# Patient Record
Sex: FEMALE | Race: ASIAN | NOT HISPANIC OR LATINO | Employment: FULL TIME | URBAN - METROPOLITAN AREA
[De-identification: names, ages, dates, MRNs, and addresses within clinical notes are randomized per-mention and may not be internally consistent; named-entity substitution may affect disease eponyms.]

---

## 2019-10-31 ENCOUNTER — HOSPITAL ENCOUNTER (OUTPATIENT)
Facility: MEDICAL CENTER | Age: 39
End: 2019-10-31
Attending: PREVENTIVE MEDICINE
Payer: COMMERCIAL

## 2019-10-31 ENCOUNTER — EMPLOYEE HEALTH (OUTPATIENT)
Dept: OCCUPATIONAL MEDICINE | Facility: CLINIC | Age: 39
End: 2019-10-31

## 2019-10-31 ENCOUNTER — EH NON-PROVIDER (OUTPATIENT)
Dept: OCCUPATIONAL MEDICINE | Facility: CLINIC | Age: 39
End: 2019-10-31

## 2019-10-31 VITALS
TEMPERATURE: 97 F | OXYGEN SATURATION: 100 % | HEART RATE: 69 BPM | SYSTOLIC BLOOD PRESSURE: 120 MMHG | DIASTOLIC BLOOD PRESSURE: 84 MMHG | HEIGHT: 63 IN | BODY MASS INDEX: 23.57 KG/M2 | WEIGHT: 133 LBS

## 2019-10-31 DIAGNOSIS — Z02.1 PRE-EMPLOYMENT DRUG SCREENING: ICD-10-CM

## 2019-10-31 DIAGNOSIS — Z02.1 PRE-EMPLOYMENT HEALTH SCREENING EXAMINATION: ICD-10-CM

## 2019-10-31 LAB
AMP AMPHETAMINE: NORMAL
BAR BARBITURATES: NORMAL
BZO BENZODIAZEPINES: NORMAL
COC COCAINE: NORMAL
INT CON NEG: NORMAL
INT CON POS: NORMAL
MDMA ECSTASY: NORMAL
MET METHAMPHETAMINES: NORMAL
MTD METHADONE: NORMAL
OPI OPIATES: NORMAL
OXY OXYCODONE: NORMAL
PCP PHENCYCLIDINE: NORMAL
POC URINE DRUG SCREEN OCDRS: NORMAL
THC: NORMAL

## 2019-10-31 PROCEDURE — 90707 MMR VACCINE SC: CPT | Performed by: PREVENTIVE MEDICINE

## 2019-10-31 PROCEDURE — 80305 DRUG TEST PRSMV DIR OPT OBS: CPT | Performed by: PREVENTIVE MEDICINE

## 2019-10-31 PROCEDURE — 86787 VARICELLA-ZOSTER ANTIBODY: CPT | Performed by: PREVENTIVE MEDICINE

## 2019-10-31 PROCEDURE — 8915 PR COMPREHENSIVE PHYSICAL: Performed by: NURSE PRACTITIONER

## 2019-10-31 PROCEDURE — 86480 TB TEST CELL IMMUN MEASURE: CPT | Performed by: PREVENTIVE MEDICINE

## 2019-10-31 PROCEDURE — 94375 RESPIRATORY FLOW VOLUME LOOP: CPT | Performed by: PREVENTIVE MEDICINE

## 2019-10-31 PROCEDURE — 90686 IIV4 VACC NO PRSV 0.5 ML IM: CPT | Performed by: PREVENTIVE MEDICINE

## 2019-10-31 SDOH — HEALTH STABILITY: MENTAL HEALTH: HOW OFTEN DO YOU HAVE A DRINK CONTAINING ALCOHOL?: NEVER

## 2019-10-31 ASSESSMENT — VISUAL ACUITY
OS_CC: 20/15
OD_CC: 20/15

## 2019-11-01 LAB
GAMMA INTERFERON BACKGROUND BLD IA-ACNC: 0.04 IU/ML
M TB IFN-G BLD-IMP: NEGATIVE
M TB IFN-G CD4+ BCKGRND COR BLD-ACNC: 0.26 IU/ML
MITOGEN IGNF BCKGRD COR BLD-ACNC: >10 IU/ML
QFT TB2 - NIL TBQ2: 0.26 IU/ML
VZV IGG SER IA-ACNC: 1.32

## 2019-11-06 ENCOUNTER — EH NON-PROVIDER (OUTPATIENT)
Dept: OCCUPATIONAL MEDICINE | Facility: CLINIC | Age: 39
End: 2019-11-06

## 2019-11-06 DIAGNOSIS — Z01.89 RESPIRATORY CLEARANCE EXAMINATION, ENCOUNTER FOR: ICD-10-CM

## 2019-11-06 DIAGNOSIS — Z71.85 IMMUNIZATION COUNSELING: ICD-10-CM

## 2020-01-08 ENCOUNTER — HOSPITAL ENCOUNTER (OUTPATIENT)
Facility: MEDICAL CENTER | Age: 40
End: 2020-01-09
Attending: EMERGENCY MEDICINE
Payer: COMMERCIAL

## 2020-01-08 ENCOUNTER — NON-PROVIDER VISIT (OUTPATIENT)
Dept: OCCUPATIONAL MEDICINE | Facility: CLINIC | Age: 40
End: 2020-01-08
Payer: COMMERCIAL

## 2020-01-08 ENCOUNTER — APPOINTMENT (OUTPATIENT)
Dept: RADIOLOGY | Facility: MEDICAL CENTER | Age: 40
End: 2020-01-08
Attending: EMERGENCY MEDICINE
Payer: COMMERCIAL

## 2020-01-08 DIAGNOSIS — Z02.83 ENCOUNTER FOR DRUG SCREENING: ICD-10-CM

## 2020-01-08 DIAGNOSIS — Z02.1 PRE-EMPLOYMENT DRUG SCREENING: ICD-10-CM

## 2020-01-08 DIAGNOSIS — R55 SYNCOPE, VASOVAGAL: ICD-10-CM

## 2020-01-08 LAB
ALBUMIN SERPL BCP-MCNC: 3.6 G/DL (ref 3.2–4.9)
ALBUMIN/GLOB SERPL: 1.4 G/DL
ALP SERPL-CCNC: 41 U/L (ref 30–99)
ALT SERPL-CCNC: 12 U/L (ref 2–50)
ANION GAP SERPL CALC-SCNC: 9 MMOL/L (ref 0–11.9)
AST SERPL-CCNC: 15 U/L (ref 12–45)
BASOPHILS # BLD AUTO: 0.4 % (ref 0–1.8)
BASOPHILS # BLD: 0.03 K/UL (ref 0–0.12)
BILIRUB SERPL-MCNC: 0.4 MG/DL (ref 0.1–1.5)
BUN SERPL-MCNC: 18 MG/DL (ref 8–22)
CALCIUM SERPL-MCNC: 8.6 MG/DL (ref 8.5–10.5)
CHLORIDE SERPL-SCNC: 105 MMOL/L (ref 96–112)
CO2 SERPL-SCNC: 25 MMOL/L (ref 20–33)
CREAT SERPL-MCNC: 0.75 MG/DL (ref 0.5–1.4)
D DIMER PPP IA.FEU-MCNC: 0.45 UG/ML (FEU) (ref 0–0.5)
EOSINOPHIL # BLD AUTO: 0.08 K/UL (ref 0–0.51)
EOSINOPHIL NFR BLD: 1.1 % (ref 0–6.9)
ERYTHROCYTE [DISTWIDTH] IN BLOOD BY AUTOMATED COUNT: 42.8 FL (ref 35.9–50)
GLOBULIN SER CALC-MCNC: 2.6 G/DL (ref 1.9–3.5)
GLUCOSE BLD-MCNC: 113 MG/DL (ref 65–99)
GLUCOSE SERPL-MCNC: 159 MG/DL (ref 65–99)
HCG SERPL QL: NEGATIVE
HCT VFR BLD AUTO: 41.4 % (ref 37–47)
HGB BLD-MCNC: 13.2 G/DL (ref 12–16)
IMM GRANULOCYTES # BLD AUTO: 0.04 K/UL (ref 0–0.11)
IMM GRANULOCYTES NFR BLD AUTO: 0.6 % (ref 0–0.9)
LYMPHOCYTES # BLD AUTO: 2.36 K/UL (ref 1–4.8)
LYMPHOCYTES NFR BLD: 32.8 % (ref 22–41)
MCH RBC QN AUTO: 30.6 PG (ref 27–33)
MCHC RBC AUTO-ENTMCNC: 31.9 G/DL (ref 33.6–35)
MCV RBC AUTO: 96.1 FL (ref 81.4–97.8)
MONOCYTES # BLD AUTO: 0.2 K/UL (ref 0–0.85)
MONOCYTES NFR BLD AUTO: 2.8 % (ref 0–13.4)
NEUTROPHILS # BLD AUTO: 4.49 K/UL (ref 2–7.15)
NEUTROPHILS NFR BLD: 62.3 % (ref 44–72)
NRBC # BLD AUTO: 0 K/UL
NRBC BLD-RTO: 0 /100 WBC
PLATELET # BLD AUTO: 267 K/UL (ref 164–446)
PMV BLD AUTO: 9.5 FL (ref 9–12.9)
POTASSIUM SERPL-SCNC: 3.6 MMOL/L (ref 3.6–5.5)
PROT SERPL-MCNC: 6.2 G/DL (ref 6–8.2)
RBC # BLD AUTO: 4.31 M/UL (ref 4.2–5.4)
SODIUM SERPL-SCNC: 139 MMOL/L (ref 135–145)
TROPONIN T SERPL-MCNC: <6 NG/L (ref 6–19)
WBC # BLD AUTO: 7.2 K/UL (ref 4.8–10.8)

## 2020-01-08 PROCEDURE — 99026 IN-HOSPITAL ON CALL SERVICE: CPT | Performed by: PREVENTIVE MEDICINE

## 2020-01-08 PROCEDURE — 84484 ASSAY OF TROPONIN QUANT: CPT

## 2020-01-08 PROCEDURE — 80305 DRUG TEST PRSMV DIR OPT OBS: CPT | Performed by: PREVENTIVE MEDICINE

## 2020-01-08 PROCEDURE — 84703 CHORIONIC GONADOTROPIN ASSAY: CPT

## 2020-01-08 PROCEDURE — 93005 ELECTROCARDIOGRAM TRACING: CPT | Performed by: EMERGENCY MEDICINE

## 2020-01-08 PROCEDURE — 71046 X-RAY EXAM CHEST 2 VIEWS: CPT

## 2020-01-08 PROCEDURE — 93005 ELECTROCARDIOGRAM TRACING: CPT

## 2020-01-08 PROCEDURE — 85025 COMPLETE CBC W/AUTO DIFF WBC: CPT

## 2020-01-08 PROCEDURE — 80053 COMPREHEN METABOLIC PANEL: CPT

## 2020-01-08 PROCEDURE — 99285 EMERGENCY DEPT VISIT HI MDM: CPT

## 2020-01-08 PROCEDURE — 85379 FIBRIN DEGRADATION QUANT: CPT

## 2020-01-08 PROCEDURE — 82962 GLUCOSE BLOOD TEST: CPT

## 2020-01-08 PROCEDURE — 36415 COLL VENOUS BLD VENIPUNCTURE: CPT

## 2020-01-08 PROCEDURE — 82075 ASSAY OF BREATH ETHANOL: CPT | Performed by: PREVENTIVE MEDICINE

## 2020-01-08 RX ORDER — M-VIT,TX,IRON,MINS/CALC/FOLIC 27MG-0.4MG
1 TABLET ORAL DAILY
COMMUNITY

## 2020-01-08 NOTE — LETTER
"  FORM C-4:  EMPLOYEE’S CLAIM FOR COMPENSATION/ REPORT OF INITIAL TREATMENT  EMPLOYEE’S CLAIM - PROVIDE ALL INFORMATION REQUESTED   First Name Emeli Last Name Nhan Birthdate 1980  Sex female Claim Number   Home Employee Address [address]   [facility]                                     Zip  [address] Height  1.6 m (5' 3\") Weight  59 kg (130 lb) [facility]     Mailing Employee Address [address]    [facility]               Zip  [address] Telephone  [phone] (home)  Primary Language Spoken   Insurer   Third Party   WORKERS CHOICE 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 (if applicable) rounds    How did this injury or occupational disease occur? Be specific and answer in detail. Use additional sheet if necessary)  no. It was my first time to faint at work. I just really tired. I just got sick last week because almost everyone was getting sick on the floor, much like a flu.    If you believe that you have an occupational disease, when did you first have knowledge of the disability and it relationship to your employment? 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  I HEREBY AUTHORIZE ANY PHYSICIAN, CHIROPRACTOR, SURGEON, PRACTITIONER, OR OTHER PERSON, ANY HOSPITAL, INCLUDING [facility] OR [facility], ANY MEDICAL SERVICE ORGANIZATION, ANY INSURANCE COMPANY, OR OTHER INSTITUTION OR ORGANIZATION TO RELEASE TO EACH OTHER, ANY MEDICAL OR OTHER INFORMATION, INCLUDING BENEFITS PAID OR PAYABLE, PERTINENT TO THIS INJURY OR DISEASE, EXCEPT INFORMATION RELATIVE TO DIAGNOSIS, TREATMENT AND/OR COUNSELING FOR AIDS, PSYCHOLOGICAL CONDITIONS, ALCOHOL OR CONTROLLED SUBSTANCES, FOR WHICH I MUST GIVE SPECIFIC AUTHORIZATION.  A PHOTOSTAT OF THIS AUTHORIZATION SHALL BE AS VALID AS THE ORIGINAL.  Date   01/08/2020          Critical access hospital               Employee’s Signature   THIS REPORT MUST BE COMPLETED AND MAILED WITHIN 3 WORKING DAYS OF TREATMENT   Place [facility], EMERGENCY DEPT                                                                             Name of Facility [facility]   Date  1/8/2020 Diagnosis  (R55) Syncope, vasovagal Is there evidence the injured employee was under the influence of alcohol and/or another controlled substance at the time of accident?   Hour  11:24 PM Description of Injury or Disease  Syncope, vasovagal No   Treatment  IV fluids  Have you advised the patient to remain off work five days or more?         No   X-Ray Findings  Negative If Yes   From Date    To Date      From information given by the employee, together with medical evidence, can you directly connect this injury or occupational disease as job incurred? Yes If No, is employee capable of: Full Duty  Yes Modified Duty      Is additional medical care by a physician indicated? Yes If Modified Duty, Specify any Limitations / Restrictions       Do you know of any previous injury or disease contributing to this condition or occupational disease?  "No    Date 1/8/2020 Print Doctor’s Name Malathi Sade S I certify the employer’s copy of this form was mailed on:   Address [address] [place] [id number] [phone] INSURER’S USE ONLY   Provider’s Tax ID Number   Telephone Dept: [phone]    Doctor’s Signature e-SADE Frey M.D. Degree  MD      Form C-4 (rev.10/07)                                                                         BRIEF DESCRIPTION OF RIGHTS AND BENEFITS  (Pursuant to [place] 616C.050)    Notice of Injury or Occupational Disease (Incident Report Form C-1): If an injury or occupational disease (OD) arises out of and in the course of employment, you must provide written notice to your employer as soon as practicable, but no later than 7 days after the accident or OD. Your employer shall maintain a sufficient supply of the required forms.    Claim for Compensation (Form C-4): If medical treatment is sought, the form C-4 is available at the place of initial treatment. A completed \"Claim for Compensation\" (Form C-4) must be filed within 90 days after an accident or OD. The treating physician or chiropractor must, within 3 working days after treatment, complete and mail to the employer, the employer's insurer and third-party , the Claim for Compensation.    Medical Treatment: If you require medical treatment for your on-the-job injury or OD, you may be required to select a physician or chiropractor from a list provided by your workers’ compensation insurer, if it has contracted with an Organization for Managed Care (MCO) or Preferred Provider Organization (PPO) or providers of health care. If your employer has not entered into a contract with an MCO or PPO, you may select a physician or chiropractor from the Panel of Physicians and Chiropractors. Any medical costs related to your industrial injury or OD will be paid by your insurer.    Temporary Total Disability (TTD): If your doctor has certified that you are unable to " work for a period of at least 5 consecutive days, or 5 cumulative days in a 20-day period, or places restrictions on you that your employer does not accommodate, you may be entitled to TTD compensation.    Temporary Partial Disability (TPD): If the wage you receive upon reemployment is less than the compensation for TTD to which you are entitled, the insurer may be required to pay you TPD compensation to make up the difference. TPD can only be paid for a maximum of 24 months.    Permanent Partial Disability (PPD): When your medical condition is stable and there is an indication of a PPD as a result of your injury or OD, within 30 days, your insurer must arrange for an evaluation by a rating physician or chiropractor to determine the degree of your PPD. The amount of your PPD award depends on the date of injury, the results of the PPD evaluation and your age and wage.    Permanent Total Disability (PTD): If you are medically certified by a treating physician or chiropractor as permanently and totally disabled and have been granted a PTD status by your insurer, you are entitled to receive monthly benefits not to exceed 66 2/3% of your average monthly wage. The amount of your PTD payments is subject to reduction if you previously received a PPD award.    Vocational Rehabilitation Services: You may be eligible for vocational rehabilitation services if you are unable to return to the job due to a permanent physical impairment or permanent restrictions as a result of your injury or occupational disease.    Transportation and Per Wm Reimbursement: You may be eligible for travel expenses and per wm associated with medical treatment.    Reopening: You may be able to reopen your claim if your condition worsens after claim closure.     Appeal Process: If you disagree with a written determination issued by the insurer or the insurer does not respond to your request, you may appeal to the Department of Administration,  , by following the instructions contained in your determination letter. You must appeal the determination within 70 days from the date of the determination letter at [address], [address], [place], [place] [address], or [address], [address], [place], [place] [address]. If you disagree with the  decision, you may appeal to the Department of Administration, . You must file your appeal within 30 days from the date of the  decision letter at [address], [address], [place], [place] [address], or [address], [address], [place], [place] [address]. If you disagree with a decision of an , you may file a petition for judicial review with the District Court. You must do so within 30 days of the Appeal Officer’s decision. You may be represented by an  at your own expense or you may contact the [facility] for possible representation.    [place]  for Injured Workers (NAIW): If you disagree with a  decision, you may request that NAIW represent you without charge at an  Hearing. For information regarding denial of benefits, you may contact the [facility] at: [address], [address], [place] [address], [phone], or [address], [place] [address], [phone]    To File a Complaint with the Division: If you wish to file a complaint with the  of the Division of Industrial Relations (DIR),  please contact the Workers’ Compensation Section, [address], [address], [place], [place] [address], telephone [phone]) [phone], or [address], [address], [place], [place] [address], telephone [phone].    For assistance with Workers’ Compensation Issues: You may contact the Office of the Governor Consumer Health Assistance, [address], [address], [place], [place] [address], Toll Free 1-666.689.1292, Web site:  [email or web address] [email or web address]., E-mail [email or web address].  D-2 (rev. 06/18)              __________________________________________________________________                                    ___01/08/2020__            Employee Name / Signature                                                                                                                            Date

## 2020-01-09 ENCOUNTER — APPOINTMENT (OUTPATIENT)
Dept: CARDIOLOGY | Facility: MEDICAL CENTER | Age: 40
End: 2020-01-09
Attending: INTERNAL MEDICINE
Payer: COMMERCIAL

## 2020-01-09 VITALS
TEMPERATURE: 96.7 F | OXYGEN SATURATION: 97 % | BODY MASS INDEX: 23.04 KG/M2 | DIASTOLIC BLOOD PRESSURE: 69 MMHG | RESPIRATION RATE: 16 BRPM | HEIGHT: 63 IN | SYSTOLIC BLOOD PRESSURE: 113 MMHG | HEART RATE: 79 BPM | WEIGHT: 130 LBS

## 2020-01-09 PROBLEM — I31.9 PERICARDITIS: Status: ACTIVE | Noted: 2020-01-09

## 2020-01-09 PROBLEM — R55 SYNCOPE: Status: ACTIVE | Noted: 2020-01-09

## 2020-01-09 LAB
CRP SERPL HS-MCNC: 0.07 MG/DL (ref 0–0.75)
EKG IMPRESSION: NORMAL
ERYTHROCYTE [SEDIMENTATION RATE] IN BLOOD BY WESTERGREN METHOD: 10 MM/HOUR (ref 0–20)
LV EJECT FRACT  99904: 65
LV EJECT FRACT MOD 2C 99903: 71.9
LV EJECT FRACT MOD 4C 99902: 67.62
LV EJECT FRACT MOD BP 99901: 67.77
TROPONIN T SERPL-MCNC: <6 NG/L (ref 6–19)

## 2020-01-09 PROCEDURE — 700105 HCHG RX REV CODE 258: Performed by: INTERNAL MEDICINE

## 2020-01-09 PROCEDURE — 99236 HOSP IP/OBS SAME DATE HI 85: CPT | Performed by: INTERNAL MEDICINE

## 2020-01-09 PROCEDURE — A9270 NON-COVERED ITEM OR SERVICE: HCPCS | Performed by: INTERNAL MEDICINE

## 2020-01-09 PROCEDURE — G0378 HOSPITAL OBSERVATION PER HR: HCPCS

## 2020-01-09 PROCEDURE — 84484 ASSAY OF TROPONIN QUANT: CPT

## 2020-01-09 PROCEDURE — 86140 C-REACTIVE PROTEIN: CPT

## 2020-01-09 PROCEDURE — 93306 TTE W/DOPPLER COMPLETE: CPT | Mod: 26 | Performed by: INTERNAL MEDICINE

## 2020-01-09 PROCEDURE — 85652 RBC SED RATE AUTOMATED: CPT

## 2020-01-09 PROCEDURE — 700102 HCHG RX REV CODE 250 W/ 637 OVERRIDE(OP): Performed by: INTERNAL MEDICINE

## 2020-01-09 PROCEDURE — 93306 TTE W/DOPPLER COMPLETE: CPT

## 2020-01-09 RX ORDER — COLCHICINE 0.6 MG/1
0.6 TABLET ORAL DAILY
Status: DISCONTINUED | OUTPATIENT
Start: 2020-01-09 | End: 2020-01-09 | Stop reason: HOSPADM

## 2020-01-09 RX ORDER — ACETAMINOPHEN 325 MG/1
650 TABLET ORAL EVERY 6 HOURS PRN
Status: DISCONTINUED | OUTPATIENT
Start: 2020-01-09 | End: 2020-01-09 | Stop reason: HOSPADM

## 2020-01-09 RX ORDER — COLCHICINE 0.6 MG/1
0.6 TABLET ORAL 2 TIMES DAILY
Qty: 60 TAB | Refills: 2 | Status: SHIPPED | OUTPATIENT
Start: 2020-01-09 | End: 2020-06-10

## 2020-01-09 RX ORDER — POLYETHYLENE GLYCOL 3350 17 G/17G
1 POWDER, FOR SOLUTION ORAL
Status: DISCONTINUED | OUTPATIENT
Start: 2020-01-09 | End: 2020-01-09 | Stop reason: HOSPADM

## 2020-01-09 RX ORDER — BISACODYL 10 MG
10 SUPPOSITORY, RECTAL RECTAL
Status: DISCONTINUED | OUTPATIENT
Start: 2020-01-09 | End: 2020-01-09 | Stop reason: HOSPADM

## 2020-01-09 RX ORDER — ASPIRIN 325 MG
650 TABLET ORAL 3 TIMES DAILY
Status: DISCONTINUED | OUTPATIENT
Start: 2020-01-09 | End: 2020-01-09 | Stop reason: HOSPADM

## 2020-01-09 RX ORDER — ASPIRIN 325 MG
650 TABLET ORAL 3 TIMES DAILY
Qty: 50 TAB | Refills: 0 | Status: SHIPPED | OUTPATIENT
Start: 2020-01-09 | End: 2020-06-10

## 2020-01-09 RX ORDER — SENNOSIDES 8.6 MG
650 CAPSULE ORAL EVERY 6 HOURS PRN
COMMUNITY
End: 2020-10-02

## 2020-01-09 RX ORDER — LANOLIN ALCOHOL/MO/W.PET/CERES
6-9 CREAM (GRAM) TOPICAL
COMMUNITY
End: 2020-01-31

## 2020-01-09 RX ORDER — SODIUM CHLORIDE, SODIUM LACTATE, POTASSIUM CHLORIDE, CALCIUM CHLORIDE 600; 310; 30; 20 MG/100ML; MG/100ML; MG/100ML; MG/100ML
INJECTION, SOLUTION INTRAVENOUS CONTINUOUS
Status: DISCONTINUED | OUTPATIENT
Start: 2020-01-09 | End: 2020-01-09 | Stop reason: HOSPADM

## 2020-01-09 RX ORDER — AMOXICILLIN 250 MG
2 CAPSULE ORAL 2 TIMES DAILY
Status: DISCONTINUED | OUTPATIENT
Start: 2020-01-09 | End: 2020-01-09 | Stop reason: HOSPADM

## 2020-01-09 RX ADMIN — ASPIRIN 650 MG: 325 TABLET, FILM COATED ORAL at 02:00

## 2020-01-09 RX ADMIN — SODIUM CHLORIDE, POTASSIUM CHLORIDE, SODIUM LACTATE AND CALCIUM CHLORIDE: 600; 310; 30; 20 INJECTION, SOLUTION INTRAVENOUS at 06:23

## 2020-01-09 RX ADMIN — ASPIRIN 650 MG: 325 TABLET, FILM COATED ORAL at 06:18

## 2020-01-09 RX ADMIN — COLCHICINE 0.6 MG: 0.6 TABLET, FILM COATED ORAL at 06:18

## 2020-01-09 ASSESSMENT — ENCOUNTER SYMPTOMS
SORE THROAT: 0
DIZZINESS: 1
SPUTUM PRODUCTION: 0
FOCAL WEAKNESS: 0
FLANK PAIN: 0
VOMITING: 0
DIARRHEA: 0
SHORTNESS OF BREATH: 0
PALPITATIONS: 0
BRUISES/BLEEDS EASILY: 0
HEADACHES: 0
FEVER: 0
COUGH: 0
CHILLS: 0
SEIZURES: 0
NECK PAIN: 0
NAUSEA: 0
ABDOMINAL PAIN: 0
WHEEZING: 0
LOSS OF CONSCIOUSNESS: 1
BLURRED VISION: 0
BLOOD IN STOOL: 0
MYALGIAS: 0
BACK PAIN: 0
DIAPHORESIS: 0

## 2020-01-09 ASSESSMENT — COPD QUESTIONNAIRES
DO YOU EVER COUGH UP ANY MUCUS OR PHLEGM?: NO/ONLY WITH OCCASIONAL COLDS OR INFECTIONS
DURING THE PAST 4 WEEKS HOW MUCH DID YOU FEEL SHORT OF BREATH: NONE/LITTLE OF THE TIME
HAVE YOU SMOKED AT LEAST 100 CIGARETTES IN YOUR ENTIRE LIFE: NO/DON'T KNOW
COPD SCREENING SCORE: 0

## 2020-01-09 ASSESSMENT — LIFESTYLE VARIABLES: EVER_SMOKED: NEVER

## 2020-01-09 NOTE — ASSESSMENT & PLAN NOTE
I have started the patient on aspirin 650 mg 3 times daily and colchicine 0.6 mg daily  Check 2D echo  Check ESR and CRP  Continuous cardiac monitoring with serial EKG and troponin

## 2020-01-09 NOTE — ED NOTES
Pt discharged home. Explained discharge instructions. Questions and concerns addressed. Pt verbalized understanding of instructions. Wristband removed. Pt advised to follow-up with pcp or return to ED for any new or worsening of symptoms. Pt is ambulating well and steady on feet. VS stable. Pt family member will be escorting pt home.     PIV removed and dressing applied  Pt verbalized understanding of medication instructions.

## 2020-01-09 NOTE — ED NOTES
Pt ambulatory to restroom with steady gait and no dizziness. Pt updated on POC, hospitalist contacted.

## 2020-01-09 NOTE — DISCHARGE SUMMARY
Discharge Summary    CHIEF COMPLAINT ON ADMISSION  Chief Complaint   Patient presents with   • Chest Pain     left side   • Dizziness     pt has hx or prolapse mitral valve       Reason for Admission  Syncope     Admission Date  1/8/2020    CODE STATUS  Full Code    HPI & HOSPITAL COURSE  This is a 39 y.o. female here with  a syncopal episode that occurred earlier today.  Patient states she had a viral upper respiratory tract infection a week ago.  Today at work she developed symptoms of lightheadedness after which she had a syncopal episode.  She noted some left-sided chest pain without radiation.  Her pain would improve with leaning forward and worse with lying down.  She denies any fevers, chills, headache, nausea, vomiting or abdominal pain.  She denies any new medications.  She does not smoke, drink alcohol or use illicit drugs.     EKG showed sinus rhythm with minimal diffuse ST elevations consistent with pericarditis  Chest x-ray interpreted by me reveals no acute cardiopulmonary process   Echo showed normal EF, essentially normal.  CRP/ESR/trops were WNL.    Cause of her pericarditis is most likely viral.  Patient had no dysrhythmia on tele here and her syncope likely was vasovagal due to viral illness/pericarditis.  Patient was started on ASA/colchicine with improvement of sx's and would like to go home.  Patient is otherwise healthy, had no high risk indications for her pericarditis and can discharge home with outpatient f/u.  She was advised to return to hospital for worsening sx's.  No strenuous activities recommended until complete resolution of her pericarditis.       Therefore, she is discharged in good and stable condition to home with close outpatient follow-up.    The patient recovered much more quickly than anticipated on admission.    Discharge Date  1/9/20    FOLLOW UP ITEMS POST DISCHARGE  pcp next week    DISCHARGE DIAGNOSES  Active Problems:    Pericarditis POA: Yes    Syncope POA:  Yes  Resolved Problems:    * No resolved hospital problems. *      FOLLOW UP  No future appointments.  [address] [address]  [phone]  Schedule an appointment as soon as possible for a visit in 1 day  to establish with a PCP, For recheck    [address] [phone]  Schedule an appointment as soon as possible for a visit in 1 day  to establish with a PCP, For recheck        In 1 week        MEDICATIONS ON DISCHARGE     Medication List      START taking these medications      Instructions   aspirin 325 MG Tabs  Commonly known as:  ASA   Take 2 Tabs by mouth 3 times a day.  Dose:  650 mg     colchicine 0.6 MG Tabs  Commonly known as:  COLCRYS   Take 1 Tab by mouth 2 times a day.  Dose:  0.6 mg        CONTINUE taking these medications      Instructions   CALCIUM PO   Take 1 Tab by mouth every morning.  Dose:  1 Tab     CHONDROITIN SULFATE PO   Take 2 Tabs by mouth every evening.  Dose:  2 Tab     COQ10 PO   Take 2 Tabs by mouth every morning.  Dose:  2 Tab     melatonin 3 MG Tabs   Take 6-9 mg by mouth every bedtime.  Dose:  6-9 mg     NYQUIL HBP COLD & FLU 15-6. MG/15ML Liqd  Generic drug:  DM-Doxylamine-Acetaminophen   Take 30 mL by mouth every four hours as needed (cold).  Dose:  30 mL     therapeutic multivitamin-minerals Tabs   Take 1 Tab by mouth every day.  Dose:  1 Tab     TYLENOL 8 HOUR ARTHRITIS PAIN 650 MG CR tablet  Generic drug:  acetaminophen   Take 650 mg by mouth every 6 hours as needed for Moderate Pain.  Dose:  650 mg     VITAMIN B12 PO   Take 1 Tab by mouth every morning.  Dose:  1 Tab     VITAMIN C PO   Take 1 Tab by mouth every morning.  Dose:  1 Tab     VITAMIN D PO   Take 1 Tab by mouth every morning.  Dose:  1 Tab            Allergies  No Known Allergies    DIET  Orders Placed This Encounter   Procedures   • Diet Order Cardiac     Standing Status:   Standing     Number of  Occurrences:   1     Order Specific Question:   Diet:     Answer:   Cardiac [6]     Order Specific Question:   Miscellaneous modifications:     Answer:   No Decaf, No Caffeine(for test) [11]       ACTIVITY  Light duty.  Weight bearing as tolerated    CONSULTATIONS  none    PROCEDURES  none    LABORATORY  Lab Results   Component Value Date    SODIUM 139 01/08/2020    POTASSIUM 3.6 01/08/2020    CHLORIDE 105 01/08/2020    CO2 25 01/08/2020    GLUCOSE 159 (H) 01/08/2020    BUN 18 01/08/2020    CREATININE 0.75 01/08/2020        Lab Results   Component Value Date    WBC 7.2 01/08/2020    HEMOGLOBIN 13.2 01/08/2020    HEMATOCRIT 41.4 01/08/2020    PLATELETCT 267 01/08/2020        Total time of the discharge process exceeds 30 minutes.

## 2020-01-09 NOTE — ED PROVIDER NOTES
ED Provider Note    CHIEF COMPLAINT  Chief Complaint   Patient presents with   • Chest Pain     left side   • Dizziness     pt has hx or prolapse mitral valve       HPI  Emeli Justin is a 39 y.o. female with a history of mitral valve prolapse who presents with a chief complaint of syncope.  Patient reports that she had a flulike illness approximately 1 week ago that consisted of cough and sore throat.  It resolved on its own but this morning after getting off of her work/night shift she felt fatigued and took NyQuil and melatonin in order to facilitate a good sleep due to concern that she might be getting ill again.  She woke up at her baseline state of health and presented to work but shortly thereafter she began to feel lightheaded, as though the blood was draining from her brain.  She then lost her vision and felt cold as well as nauseated.  She subsequently had a syncopal episode.  When she awoke, she noted a sensation of left-sided chest tightness that persisted for several minutes before it resolved on its own.  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  She has never had similar symptoms.  She denies any history of DVT or PE, lower extremity edema, exogenous hormone use, recent surgeries or long distance travel.  She further denies any tongue biting or incontinence.    REVIEW OF SYSTEMS  See HPI for further details.  Chest pain.  Fatigue.  Syncope.  All other systems are negative.     PAST MEDICAL HISTORY   has a past medical history of Mitral valve prolapse (2015) and Pre-eclampsia (2014).    SOCIAL HISTORY  Social History     Tobacco Use   • Smoking status: Never Smoker   • Smokeless tobacco: Never Used   Substance and Sexual Activity   • Alcohol use: Never     Frequency: Never   • Drug use: Never   • Sexual activity: Not on file       SURGICAL HISTORY  patient denies any surgical history    CURRENT MEDICATIONS  Home Medications     Reviewed by Fatoumata Raza R.N.  "(Registered Nurse) on 20 at 1953  Med List Status: Complete   Medication Last Dose Status   therapeutic multivitamin-minerals (THERAGRAN-M) Tab  Active                ALLERGIES  No Known Allergies    PHYSICAL EXAM  VITAL SIGNS: BP (!) 108/34   Pulse 68   Temp 35.9 °C (96.7 °F) (Temporal)   Resp 14   Ht 1.6 m (5' 3\")   Wt 59 kg (130 lb)   LMP 2019 (Exact Date)   SpO2 100%   BMI 23.03 kg/m²     Pulse ox interpretation: I interpret this pulse ox as normal.  Constitutional: Alert in no apparent distress.  HENT: No signs of trauma, Bilateral external ears normal, Nose normal.  Moist mucous membranes.  Eyes: Pupils are equal and reactive, Conjunctiva normal, Non-icteric.   Neck: Normal range of motion, No tenderness, Supple, No stridor.   Lymphatic: No lymphadenopathy noted.   Cardiovascular: Regular rate and rhythm, no murmurs.   Thorax & Lungs: Normal breath sounds, No respiratory distress, No wheezing, No chest tenderness.   Abdomen: Bowel sounds normal, Soft, No tenderness, No masses, No pulsatile masses. No peritoneal signs.  Skin: Warm, Dry, No erythema, No rash.   Back: Normal alignment.  Extremities: Intact distal pulses, No edema, No tenderness, No cyanosis.  Musculoskeletal: Good range of motion in all major joints. No tenderness to palpation or major deformities noted.   Neurologic: Alert, Normal motor function, Normal sensory function, No focal deficits noted.   Psychiatric: Affect normal, Judgment normal, Mood normal.     DIAGNOSTIC STUDIES / PROCEDURES    EKG  Results for orders placed or performed during the hospital encounter of 20   EKG (NOW)   Result Value Ref Range    Report       [facility] Emergency Dept.    Test Date:  2020  Pt Name:    YAN NGUYEN             Department: ER  MRN:        [id number]                      Room:  Gender:     Female                       Technician: [id number]  :        1980                   Requested By:ER TRIAGE " PROTOCOL  Order #:    [id number]                    Reading MD: Edenilson Servin MD    Measurements  Intervals                                Axis  Rate:       70                           P:          39  MO:         152                          QRS:        79  QRSD:       78                           T:          45  QT:         408  QTc:        441    Interpretive Statements  SINUS RHYTHM  ST ELEVATION SUGGESTS PERICARDITIS  BASELINE WANDER IN LEAD(S) V2  No previous ECG available for comparison  Electronically Signed On 1-9-2020 1:28:58 PST by Edenilson Servin MD       LABS  CBC  CMP  Troponin  Accu-Chek  D-dimer  hCG    RADIOLOGY  Chest x-ray    COURSE & MEDICAL DECISION MAKING  Pertinent Labs & Imaging studies reviewed. (See chart for details)  This is a 39-year-old female with a history of mitral valve prolapse who is here with an episode of syncope following lightheadedness, nausea, and chest tightness.  Differential diagnosis includes, but is not limited to, arrhythmia, PE, vasovagal syncope, dehydration, electrolyte abnormality, hypoglycemia, seizure.  Arrives afebrile with normal vital signs.  Appears well-hydrated and nontoxic.  Physical exam is unremarkable.  She is alert and oriented x4.  She notes that she still feels lightheaded with some persistent left-sided chest tightness.    An EKG does reveal some ST elevation in the lateral leads consistent with potential pericarditis.  There are no reciprocal inversions and the EKG does not meet STEMI criteria.    Accu-Chek is 113.    CBC is grossly normal.  Metabolic panel is also normal with the exception of a blood glucose at 159.  Troponin is negative.  An hCG is negative.  D-dimer is negative.    Chest x-ray is normal.    The patient was ruled out for PE by PERC criteria:    AGE < 50  No estrogens, BCPs, recent surgery (4 weeks)  No hx of: DVT/PE or hemoptysis  No unilateral leg swelling  Pulse Ox > 94% and HR <100    Orthostatics are negative but patient reports  that she becomes dizzy with standing and ambulation.  Upon reevaluation, patient reports continued lightheadedness and chest tightness.  No new symptoms.  She will benefit from overnight hospitalization, observation, and potential cardiology consult tomorrow morning.  Patient is comfortable with the plan.    I discussed the patient with the on-call hospitalist, Dr. Guerrero, who will evaluate the patient for hospitalization.    DISPO  The patient was hospitalized by Dr. Guerrero in guarded condition.    HYDRATION  HYDRATION: Based on the patient's presentation of Other Syncope the patient was given IV fluids. IV Hydration was used because oral hydration was not adequate alone. Upon recheck following hydration, the patient was Unchanged.    FINAL IMPRESSION  1.  Syncope  2.  Chest pressure    Electronically signed by: Edenilson Servin, 1/8/2020 8:08 PM

## 2020-01-09 NOTE — DISCHARGE INSTRUCTIONS
You were seen in the ER for loss of consciousness.  Your labs and imaging did not reveal an acute abnormality that requires further work-up, consultation, or admission to the hospital.  You are safe for discharge.  I suspect that you passed out due to something called a vasovagal episode which happens when you get dehydrated, your calories are low, or you see/feel something that is unpleasant to you.  It is important that you follow-up with your primary care physician and I have given you a list of local clinics where you can do this.  Please call 1 tomorrow morning to make an appointment.  Return immediately to the ER with any new or worsening symptoms.

## 2020-01-09 NOTE — ED NOTES
Medication reconciliation updated and complete per pt at bedside  Allergies have been verified   No oral ABX within the last 14 days  Pt home pharmacy:[facility]

## 2020-01-09 NOTE — ED NOTES
Assist rn    Pt informs that she is feeling lightheaded after returning from the bathroom and would like to lay down. The pt appears pale at this time. BP retaken and primary RN notified

## 2020-01-09 NOTE — ED TRIAGE NOTES
"Chief Complaint   Patient presents with   • Chest Pain     left side   • Dizziness     pt has hx or prolapse mitral valve       Pt code assist from [facility].  Pt RN beginning shift and getting report.  Pt got dizzy and light headed and having left side chest pain.  Pt denies previous episode.     BP (!) 108/34   Pulse 68   Temp 35.9 °C (96.7 °F) (Temporal)   Resp 14   Ht 1.6 m (5' 3\")   Wt 59 kg (130 lb)   LMP 12/26/2019 (Exact Date)   SpO2 100%   BMI 23.03 kg/m²    "

## 2020-01-09 NOTE — ED NOTES
asssit rn    Pt wheeled to bathroom via wheelchair by workman's comp testing employee. The pt denies any lightheadedness, h/a, or blurred/tunneling vision at this time.

## 2020-01-09 NOTE — ED NOTES
This patient's attending physician will be Dr. Mello starting at 0700. Please page him with questions/updates.

## 2020-01-09 NOTE — ED NOTES
"Pt resting. Pt alert, no distress noted. Pt requesting \"I feel better than yesterday, do you think I will be able to go home today?\" Hospitalist paged and made aware of pt request.   "

## 2020-01-09 NOTE — ED NOTES
Education provided on POC, pt verbalized understanding. Admitting DX discussed and EKG results reviewed.

## 2020-01-15 LAB
AMP AMPHETAMINE: NORMAL
BAR BARBITURATES: NORMAL
BREATH ALCOHOL COMMENT: NORMAL
BZO BENZODIAZEPINES: NORMAL
COC COCAINE: NORMAL
INT CON NEG: NORMAL
INT CON POS: NORMAL
MDMA ECSTASY: NORMAL
MET METHAMPHETAMINES: NORMAL
MTD METHADONE: NORMAL
OPI OPIATES: NORMAL
OXY OXYCODONE: NORMAL
PCP PHENCYCLIDINE: NORMAL
POC BREATHALIZER: 0 PERCENT (ref 0–0.01)
POC URINE DRUG SCREEN OCDRS: NEGATIVE
THC: NORMAL

## 2020-01-22 ENCOUNTER — OFFICE VISIT (OUTPATIENT)
Dept: URGENT CARE | Facility: CLINIC | Age: 40
End: 2020-01-22
Payer: COMMERCIAL

## 2020-01-22 VITALS
DIASTOLIC BLOOD PRESSURE: 78 MMHG | WEIGHT: 135.4 LBS | HEIGHT: 63 IN | BODY MASS INDEX: 23.99 KG/M2 | OXYGEN SATURATION: 97 % | TEMPERATURE: 97.2 F | HEART RATE: 72 BPM | RESPIRATION RATE: 16 BRPM | SYSTOLIC BLOOD PRESSURE: 112 MMHG

## 2020-01-22 DIAGNOSIS — I31.9 PERICARDITIS, UNSPECIFIED CHRONICITY, UNSPECIFIED TYPE: ICD-10-CM

## 2020-01-22 DIAGNOSIS — R07.9 CHEST PAIN, UNSPECIFIED TYPE: ICD-10-CM

## 2020-01-22 PROCEDURE — 99204 OFFICE O/P NEW MOD 45 MIN: CPT | Performed by: INTERNAL MEDICINE

## 2020-01-22 ASSESSMENT — ENCOUNTER SYMPTOMS
ABDOMINAL PAIN: 0
DIZZINESS: 0
PALPITATIONS: 0
DIAPHORESIS: 1

## 2020-01-22 NOTE — PROGRESS NOTES
Subjective:     Emeli Justin is a 39 y.o. female who presents for Chest Pain (intense pain last night, tightness, a little sharp, was diagnosis with perioidictis  x admitted in the ER  Janurary 8th )  The patient had URI symptoms a week before she passed out on January 8 and she had a syncopal work-up and she was found to have viral pericarditis and she was doing better and since Sunday again she started having chest pressure and when it first started she had diaphoresis, chest pressure now now it is waxing and waning and on Sunday she had sore throat and body aches and since then those symptoms have resolved     Chest Pain    This is a new problem. The current episode started in the past 7 days. The onset quality is gradual. The problem occurs intermittently. The problem has been waxing and waning. The pain is present in the lateral region and substernal region. The pain is mild. The quality of the pain is described as pressure and sharp. Associated symptoms include diaphoresis (sunday when it first started). Pertinent negatives include no abdominal pain, dizziness or palpitations.     Past Medical History:   Diagnosis Date   • Mitral valve prolapse 2015   • Pre-eclampsia 2014   No past surgical history on file.  Social History     Socioeconomic History   • Marital status: Single     Spouse name: Not on file   • Number of children: Not on file   • Years of education: Not on file   • Highest education level: Not on file   Occupational History   • Not on file   Social Needs   • Financial resource strain: Not on file   • Food insecurity:     Worry: Not on file     Inability: Not on file   • Transportation needs:     Medical: Not on file     Non-medical: Not on file   Tobacco Use   • Smoking status: Never Smoker   • Smokeless tobacco: Never Used   Substance and Sexual Activity   • Alcohol use: Never     Frequency: Never   • Drug use: Never   • Sexual activity: Not on file   Lifestyle   • Physical activity:      "Days per week: Not on file     Minutes per session: Not on file   • Stress: Not on file   Relationships   • Social connections:     Talks on phone: Not on file     Gets together: Not on file     Attends [employer] service: Not on file     Active member of club or organization: Not on file     Attends meetings of clubs or organizations: Not on file     Relationship status: Not on file   • Intimate partner violence:     Fear of current or ex partner: Not on file     Emotionally abused: Not on file     Physically abused: Not on file     Forced sexual activity: Not on file   Other Topics Concern   • Not on file   Social History Narrative   • Not on file    No family history on file. Review of Systems   Constitutional: Positive for diaphoresis (sunday when it first started).   Cardiovascular: Positive for chest pain. Negative for palpitations.   Gastrointestinal: Negative for abdominal pain.   Neurological: Negative for dizziness.   All other systems reviewed and are negative.  No Known Allergies   Objective:   /78   Pulse 72   Temp 36.2 °C (97.2 °F) (Temporal)   Resp 16   Ht 1.6 m (5' 3\")   Wt 61.4 kg (135 lb 6.4 oz)   LMP 12/26/2019 (Exact Date)   SpO2 97%   BMI 23.99 kg/m²   Physical Exam  Constitutional:       General: She is not in acute distress.     Appearance: She is well-developed.   HENT:      Head: Normocephalic and atraumatic.      Mouth/Throat:      Mouth: Mucous membranes are moist.      Pharynx: Oropharynx is clear.   Eyes:      Conjunctiva/sclera: Conjunctivae normal.   Neck:      Musculoskeletal: No neck rigidity.   Cardiovascular:      Rate and Rhythm: Normal rate and regular rhythm.   Pulmonary:      Effort: Pulmonary effort is normal. No respiratory distress.      Breath sounds: Normal breath sounds.   Chest:      Chest wall: Tenderness present.   Lymphadenopathy:      Cervical: No cervical adenopathy.   Skin:     General: Skin is warm and dry.      Capillary Refill: Capillary refill " takes less than 2 seconds.   Neurological:      Mental Status: She is alert and oriented to person, place, and time.      Sensory: No sensory deficit.      Deep Tendon Reflexes: Reflexes are normal and symmetric.   Psychiatric:         Mood and Affect: Mood normal.         Behavior: Behavior normal.           Assessment/Plan:   Assessment    1. Chest pain, unspecified type  - EKG - Clinic Performed  - REFERRAL TO CARDIOLOGY    2. Pericarditis, unspecified chronicity, unspecified type  - EKG - Clinic Performed  - REFERRAL TO CARDIOLOGY    Patient is still having pleuritic chest pain but EKG shows its improving after pericarditis and she is taking aspirin and colchicine and will do a referral to cardiology for follow-up    EKG: NSR rate:  76 , normal axis, normal intervals, no evidence of ischemia or hypertrophy.  Differential diagnosis, natural history, supportive care, and indications for immediate follow-up discussed.

## 2020-01-27 ENCOUNTER — OFFICE VISIT (OUTPATIENT)
Dept: URGENT CARE | Facility: CLINIC | Age: 40
End: 2020-01-27
Payer: COMMERCIAL

## 2020-01-27 VITALS
HEART RATE: 85 BPM | OXYGEN SATURATION: 98 % | HEIGHT: 63 IN | BODY MASS INDEX: 23.92 KG/M2 | WEIGHT: 135 LBS | TEMPERATURE: 98 F | SYSTOLIC BLOOD PRESSURE: 108 MMHG | DIASTOLIC BLOOD PRESSURE: 68 MMHG

## 2020-01-27 DIAGNOSIS — I31.9 PERICARDITIS, UNSPECIFIED CHRONICITY, UNSPECIFIED TYPE: Primary | ICD-10-CM

## 2020-01-27 DIAGNOSIS — R05.2 SUBACUTE COUGH: ICD-10-CM

## 2020-01-27 PROCEDURE — 99214 OFFICE O/P EST MOD 30 MIN: CPT | Performed by: PHYSICIAN ASSISTANT

## 2020-01-27 ASSESSMENT — ENCOUNTER SYMPTOMS
WHEEZING: 0
SINUS PAIN: 0
MYALGIAS: 1
SPUTUM PRODUCTION: 0
BACK PAIN: 0
COUGH: 1
SHORTNESS OF BREATH: 0
ABDOMINAL PAIN: 0
FATIGUE: 1
VOMITING: 0
ANOREXIA: 0
FEVER: 0
NAUSEA: 0
HEADACHES: 0

## 2020-01-27 NOTE — PROGRESS NOTES
Subjective:      Emeli Justin is a 39 y.o. female who presents with Follow-Up (Pt is due to go back to work but she doesnt feel ready to go back.  She still feels fatigue and tightness in her chest)    PMH:  has a past medical history of Mitral valve prolapse (2015) and Pre-eclampsia (2014).  MEDS:   Current Outpatient Medications:   •  DM-Doxylamine-Acetaminophen (NYQUIL HBP COLD & FLU) 15-6. MG/15ML Liquid, Take 30 mL by mouth every four hours as needed (cold)., Disp: , Rfl:   •  acetaminophen (TYLENOL 8 HOUR ARTHRITIS PAIN) 650 MG CR tablet, Take 650 mg by mouth every 6 hours as needed for Moderate Pain., Disp: , Rfl:   •  Ascorbic Acid (VITAMIN C PO), Take 1 Tab by mouth every morning., Disp: , Rfl:   •  Cholecalciferol (VITAMIN D PO), Take 1 Tab by mouth every morning., Disp: , Rfl:   •  CALCIUM PO, Take 1 Tab by mouth every morning., Disp: , Rfl:   •  Cyanocobalamin (VITAMIN B12 PO), Take 1 Tab by mouth every morning., Disp: , Rfl:   •  Coenzyme Q10 (COQ10 PO), Take 2 Tabs by mouth every morning., Disp: , Rfl:   •  melatonin 3 MG Tab, Take 6-9 mg by mouth every bedtime., Disp: , Rfl:   •  colchicine (COLCRYS) 0.6 MG Tab, Take 1 Tab by mouth 2 times a day., Disp: 60 Tab, Rfl: 2  •  aspirin (ASA) 325 MG Tab, Take 2 Tabs by mouth 3 times a day., Disp: 50 Tab, Rfl: 0  •  therapeutic multivitamin-minerals (THERAGRAN-M) Tab, Take 1 Tab by mouth every day., Disp: , Rfl:   •  CHONDROITIN SULFATE PO, Take 2 Tabs by mouth every evening., Disp: , Rfl:   ALLERGIES: No Known Allergies  SURGHX: History reviewed. No pertinent surgical history.  SOCHX:  reports that she has never smoked. She has never used smokeless tobacco. She reports that she does not drink alcohol or use drugs.  FH: Reviewed with patient, not pertinent to this visit.           Patient presents with:  Follow-Up: Pt is due to go back to work but she doesnt feel ready to go back.  She still feels fatigue and tightness in her chest with exertion  "though patient denies active pain right now.  Pt is asking for a work note to stay home a few more days.      Patient was seen in urgent care a few days ago and was placed on colchicine and aspirin for her pericarditis symptoms.  Patient states they have actually improved quite a bit since starting the colchicine but patient states she just does not feel like she is ready to return to work yet as she fatigues easily on exertion and feels pressure in her chest on exertion.    Pt has not yet been seen by cardiology, however she has an appointment on 2/7/2020 (10 days from today) and was hoping that she could not work until seen by them.      Other   This is a new problem. The current episode started 1 to 4 weeks ago. The problem occurs daily. The problem has been waxing and waning. Associated symptoms include chest pain, coughing (Occasional dry sometimes productive), fatigue and myalgias. Pertinent negatives include no abdominal pain, anorexia, congestion, fever, headaches, nausea or vomiting. The symptoms are aggravated by exertion. She has tried NSAIDs, position changes and rest for the symptoms. The treatment provided moderate relief.       Review of Systems   Constitutional: Positive for fatigue and malaise/fatigue. Negative for fever.   HENT: Negative for congestion and sinus pain.    Respiratory: Positive for cough (Occasional dry sometimes productive). Negative for sputum production, shortness of breath and wheezing.    Cardiovascular: Positive for chest pain.   Gastrointestinal: Negative for abdominal pain, anorexia, nausea and vomiting.   Musculoskeletal: Positive for myalgias. Negative for back pain.   Neurological: Negative for headaches.   All other systems reviewed and are negative.         Objective:     /68   Pulse 85   Temp 36.7 °C (98 °F)   Ht 1.6 m (5' 3\")   Wt 61.2 kg (135 lb)   SpO2 98%   BMI 23.91 kg/m²      Physical Exam  Vitals signs and nursing note reviewed.   Constitutional:      "  General: She is not in acute distress.     Appearance: Normal appearance. She is well-developed.   HENT:      Head: Normocephalic and atraumatic.      Right Ear: Tympanic membrane normal.      Left Ear: Tympanic membrane normal.      Nose: Nose normal.      Mouth/Throat:      Lips: Pink.      Mouth: Mucous membranes are moist.      Pharynx: Oropharynx is clear. Uvula midline.   Eyes:      Extraocular Movements: Extraocular movements intact.      Conjunctiva/sclera: Conjunctivae normal.      Pupils: Pupils are equal, round, and reactive to light.   Neck:      Musculoskeletal: Normal range of motion and neck supple.   Cardiovascular:      Rate and Rhythm: Normal rate and regular rhythm.      Pulses: Normal pulses.      Heart sounds: Normal heart sounds.   Pulmonary:      Effort: Pulmonary effort is normal.      Breath sounds: Normal breath sounds.   Abdominal:      General: Bowel sounds are normal.      Palpations: Abdomen is soft.   Musculoskeletal: Normal range of motion.   Skin:     General: Skin is warm and dry.      Capillary Refill: Capillary refill takes less than 2 seconds.   Neurological:      General: No focal deficit present.      Mental Status: She is alert and oriented to person, place, and time.      Gait: Gait normal.   Psychiatric:         Mood and Affect: Mood normal.            Patient declines EKG at this time as she states she is not having any active chest pain.     Assessment/Plan:     1. Pericarditis, unspecified chronicity, unspecified type  REFERRAL TO FOLLOW-UP WITH PRIMARY CARE   2. Subacute cough  REFERRAL TO FOLLOW-UP WITH PRIMARY CARE   I have reviewed patient's previous visits to include ER visit, and patient notes as well as urgent care visit 5 days ago.    I have placed an urgent referral for follow-up with primary care so the patient may be able to be seen by primary care prior to cardiology as urgent care providers cannot do [facility] paperwork which patient is seeking.     I can give  her 3 more days off from work and have discussed this with patient, she is aware she will have to return if she cannot be seen by primary care sooner.    Pt has appointment with Cardiology 2/7/2020, patient was instructed to keep this appointment.  She verbalized that she would do so.    Patient to continue taking her medications as prescribed.    Discussed red flags and reasons to go directly to the emergency department with patient.    Pt and/or family verbalized understanding of diagnosis and follow up instructions and was offered informational handout on diagnosis.  PT discharged. '

## 2020-01-27 NOTE — LETTER
January 27, 2020         Patient: Emeli Justin   YOB: 1980   Date of Visit: 1/27/2020           To Whom it May Concern:    Emeli Justin was seen in my clinic on 1/27/2020. She may return to work on 1/31/2020.    If you have any questions or concerns, please don't hesitate to call.        Sincerely,           Roxie Levy P.A.-C.  Electronically Signed

## 2020-01-28 ENCOUNTER — TELEPHONE (OUTPATIENT)
Dept: SCHEDULING | Facility: IMAGING CENTER | Age: 40
End: 2020-01-28

## 2020-01-31 ENCOUNTER — OFFICE VISIT (OUTPATIENT)
Dept: MEDICAL GROUP | Facility: MEDICAL CENTER | Age: 40
End: 2020-01-31
Payer: COMMERCIAL

## 2020-01-31 VITALS
HEIGHT: 63 IN | BODY MASS INDEX: 23.83 KG/M2 | OXYGEN SATURATION: 97 % | SYSTOLIC BLOOD PRESSURE: 118 MMHG | TEMPERATURE: 97.7 F | RESPIRATION RATE: 16 BRPM | HEART RATE: 78 BPM | WEIGHT: 134.48 LBS | DIASTOLIC BLOOD PRESSURE: 72 MMHG

## 2020-01-31 DIAGNOSIS — I31.9 PERICARDITIS, UNSPECIFIED CHRONICITY, UNSPECIFIED TYPE: ICD-10-CM

## 2020-01-31 DIAGNOSIS — M25.562 ACUTE PAIN OF BOTH KNEES: ICD-10-CM

## 2020-01-31 DIAGNOSIS — Z76.89 ENCOUNTER TO ESTABLISH CARE: ICD-10-CM

## 2020-01-31 DIAGNOSIS — M25.561 ACUTE PAIN OF BOTH KNEES: ICD-10-CM

## 2020-01-31 PROBLEM — R55 SYNCOPE: Status: RESOLVED | Noted: 2020-01-09 | Resolved: 2020-01-31

## 2020-01-31 PROCEDURE — 99214 OFFICE O/P EST MOD 30 MIN: CPT | Performed by: PHYSICIAN ASSISTANT

## 2020-01-31 ASSESSMENT — PATIENT HEALTH QUESTIONNAIRE - PHQ9: CLINICAL INTERPRETATION OF PHQ2 SCORE: 0

## 2020-01-31 NOTE — LETTER
January 31, 2020         Patient: Emeli Justin   YOB: 1980   Date of Visit: 1/31/2020           To Whom it May Concern:    Emeli Justin was seen in my clinic on 1/31/2020. She may return to work without restrictions on 2/6/20.    If you have any questions or concerns, please don't hesitate to call.        Sincerely,           Nnamdi Bocanegra P.A.-C.  Electronically Signed

## 2020-01-31 NOTE — PROGRESS NOTES
Subjective:   Emeli Justin is a 39 y.o. female here today for viral pericarditis and acute pain in both of her knees plus to establish care.    Pericarditis  This is a 39-year-old female who is here today to establish care.  Was recently seen and diagnosed at [facility] with pericarditis.  Was seen as well twice by urgent care afterwards.  Doing much better.  Symptoms began after fighting flulike symptoms.  She went to [facility] and then went to shop at the mall and had a syncopal episode.  Has a history of mitral valve prolapse.  In the hospital had a chest x-ray that was normal.  Had an echocardiogram that was normal as well.  Still has some left-sided chest discomfort.  Mild compared to last week.  Is taking aspirin 3 and 25 mg also will take Tylenol.  Also taking colchicine 0.6 mg twice a day.  Has an appointment next week with cardiology.    Acute pain of both knees  Also complains of some pain in both of her knees.  Pain is intermittent.  Pain occurred in bed last night.  She states that the pain started after being diagnosed with pericarditis.  During hospitalization she had labs ordered.  ESR and CRP were normal range.  No other abnormalities.       Current medicines (including changes today)  Current Outpatient Medications   Medication Sig Dispense Refill   • acetaminophen (TYLENOL 8 HOUR ARTHRITIS PAIN) 650 MG CR tablet Take 650 mg by mouth every 6 hours as needed for Moderate Pain.     • Ascorbic Acid (VITAMIN C PO) Take 1 Tab by mouth every morning.     • Cholecalciferol (VITAMIN D PO) Take 1 Tab by mouth every morning.     • CALCIUM PO Take 1 Tab by mouth every morning.     • Cyanocobalamin (VITAMIN B12 PO) Take 1 Tab by mouth every morning.     • Coenzyme Q10 (COQ10 PO) Take 2 Tabs by mouth every morning.     • CHONDROITIN SULFATE PO Take 2 Tabs by mouth every evening.     • colchicine (COLCRYS) 0.6 MG Tab Take 1 Tab by mouth 2 times a day. 60 Tab 2   • aspirin (ASA) 325 MG Tab Take 2 Tabs by  "mouth 3 times a day. 50 Tab 0   • therapeutic multivitamin-minerals (THERAGRAN-M) Tab Take 1 Tab by mouth every day.       No current facility-administered medications for this visit.      She  has a past medical history of Mitral valve prolapse (2015) and Pre-eclampsia (2014).    Social History and Family History were reviewed and updated.    ROS   No chest pain, no shortness of breath, no abdominal pain and all other systems were reviewed and are negative.       Objective:     /72 (BP Location: Left arm, Patient Position: Sitting, BP Cuff Size: Adult)   Pulse 78   Temp 36.5 °C (97.7 °F) (Temporal)   Resp 16   Ht 1.6 m (5' 3\")   Wt 61 kg (134 lb 7.7 oz)   SpO2 97%  Body mass index is 23.82 kg/m².   Physical Exam:  Constitutional: Alert, no distress.  Skin: Warm, dry, good turgor, no rashes in visible areas.  Eye: Equal, round and reactive, conjunctiva clear, lids normal.  ENMT: Lips without lesions, good dentition, oropharynx clear.  Neck: Trachea midline, no masses.   Lymph: No cervical or supraclavicular lymphadenopathy  Respiratory: Unlabored respiratory effort, lungs clear to auscultation, no wheezes, no ronchi.  Cardiovascular: Normal S1, S2, no murmur, no edema.  Psych: Alert and oriented x3, normal affect and mood.        Assessment and Plan:   The following treatment plan was discussed    1. Pericarditis, unspecified chronicity, unspecified type  Cute, new onset condition.  Likely postviral condition.  She is very stable today.  We will provide her a letter to return to work on the sixth.  She has an appointment with cardiology on 7.  Continue medications as directed.  Hopefully symptoms will resolve and couple more weeks.    2. Acute pain of both knees  Acute, new onset condition status post pericarditis diagnosis.  Expect resolution in a couple weeks.  Labs during hospitalization were normal range.  Do not feel they need to be performed today.  She will contact me through my chart with any " concerns.    3. Encounter to establish care      Followup: Return if symptoms worsen or fail to improve.    Please note that this dictation was created using voice recognition software. I have made every reasonable attempt to correct obvious errors, but I expect that there are errors of grammar and possibly content that I did not discover before finalizing the note.

## 2020-01-31 NOTE — ASSESSMENT & PLAN NOTE
This is a 39-year-old female who is here today to establish care.  Was recently seen and diagnosed at [facility] with pericarditis.  Was seen as well twice by urgent care afterwards.  Doing much better.  Symptoms began after fighting flulike symptoms.  She went to [employer] and then went to shop at the mall and had a syncopal episode.  Has a history of mitral valve prolapse.  In the hospital had a chest x-ray that was normal.  Had an echocardiogram that was normal as well.  Still has some left-sided chest discomfort.  Mild compared to last week.  Is taking aspirin 3 and 25 mg also will take Tylenol.  Also taking colchicine 0.6 mg twice a day.  Has an appointment next week with cardiology.

## 2020-01-31 NOTE — ASSESSMENT & PLAN NOTE
Also complains of some pain in both of her knees.  Pain is intermittent.  Pain occurred in bed last night.  She states that the pain started after being diagnosed with pericarditis.  During hospitalization she had labs ordered.  ESR and CRP were normal range.  No other abnormalities.

## 2020-02-07 ENCOUNTER — OFFICE VISIT (OUTPATIENT)
Dept: CARDIOLOGY | Facility: MEDICAL CENTER | Age: 40
End: 2020-02-07
Payer: COMMERCIAL

## 2020-02-07 VITALS
WEIGHT: 136 LBS | SYSTOLIC BLOOD PRESSURE: 110 MMHG | DIASTOLIC BLOOD PRESSURE: 62 MMHG | HEART RATE: 82 BPM | BODY MASS INDEX: 24.1 KG/M2 | OXYGEN SATURATION: 97 % | HEIGHT: 63 IN

## 2020-02-07 DIAGNOSIS — Z86.79 HISTORY OF PERICARDITIS: ICD-10-CM

## 2020-02-07 DIAGNOSIS — R07.2 CHEST PAIN, PRECORDIAL: ICD-10-CM

## 2020-02-07 LAB — EKG IMPRESSION: NORMAL

## 2020-02-07 PROCEDURE — 93000 ELECTROCARDIOGRAM COMPLETE: CPT | Performed by: INTERNAL MEDICINE

## 2020-02-07 PROCEDURE — 99204 OFFICE O/P NEW MOD 45 MIN: CPT | Performed by: INTERNAL MEDICINE

## 2020-02-07 ASSESSMENT — ENCOUNTER SYMPTOMS
LOSS OF CONSCIOUSNESS: 0
CHILLS: 0
ORTHOPNEA: 0
MYALGIAS: 0
FEVER: 0
INSOMNIA: 0
ABDOMINAL PAIN: 0
DIZZINESS: 0
SHORTNESS OF BREATH: 0
BLURRED VISION: 0
PALPITATIONS: 0
PND: 0

## 2020-02-07 NOTE — PROGRESS NOTES
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    In early January the patient was hospitalized with syncope with subsequent chest pain.  She just completed her nighttime shift on [place] [address] floor when she developed lightheadedness and was assisted and momentarily lost consciousness.  When she woke up she noted some left anterior sharp dull chest pain.  She was sent to the ER and admitted to the hospital.  In the preceding 7 to 10 days the patient suffered from a severe viral illness with sore throat, diffuse myalgias, arthralgias particularly in both knees, fevers and sweats.  She also may have had some mild diarrhea but no nausea or vomiting.  She missed work due to her illness.  Cardiac evaluation included an EKG which reported diffuse ST elevation, echocardiogram which was normal and both sed rate and CRP were normal.  She was discharged on aspirin 3 times daily and colchicine twice daily which she is continued.    The patient returned to work yesterday and has had no further problems.    While in the [facility] she had preeclampsia.  She has a relative who apparently is a physician and had an echocardiogram was told she had mitral valve prolapse.    Past Medical History:   Diagnosis Date   • Mitral valve prolapse 2015   • Pre-eclampsia 2014     No past surgical history on file.  Family History   Problem Relation Age of Onset   • Hypertension Father    • Diabetes Father      Social History     Socioeconomic History   • Marital status:      Spouse name: Not on file   • Number of children: Not on file   • Years of education: Not on file   • Highest education level: Not on file   Occupational History   • Not on file   Social Needs   • Financial resource strain: Not on file   • Food insecurity:     Worry: Not on file     Inability: " Not on file   • Transportation needs:     Medical: Not on file     Non-medical: Not on file   Tobacco Use   • Smoking status: Never Smoker   • Smokeless tobacco: Never Used   Substance and Sexual Activity   • Alcohol use: Never     Frequency: Never   • Drug use: Never   • Sexual activity: Yes     Comment: , 3 children   Lifestyle   • Physical activity:     Days per week: Not on file     Minutes per session: Not on file   • Stress: Not on file   Relationships   • Social connections:     Talks on phone: Not on file     Gets together: Not on file     Attends Nondenominational service: Not on file     Active member of club or organization: Not on file     Attends meetings of clubs or organizations: Not on file     Relationship status: Not on file   • Intimate partner violence:     Fear of current or ex partner: Not on file     Emotionally abused: Not on file     Physically abused: Not on file     Forced sexual activity: Not on file   Other Topics Concern   • Not on file   Social History Narrative   • Not on file     No Known Allergies  Outpatient Encounter Medications as of 2/7/2020   Medication Sig Dispense Refill   • acetaminophen (TYLENOL 8 HOUR ARTHRITIS PAIN) 650 MG CR tablet Take 650 mg by mouth every 6 hours as needed for Moderate Pain.     • Ascorbic Acid (VITAMIN C PO) Take 1 Tab by mouth every morning.     • Cholecalciferol (VITAMIN D PO) Take 1 Tab by mouth every morning.     • CALCIUM PO Take 1 Tab by mouth every morning.     • Cyanocobalamin (VITAMIN B12 PO) Take 1 Tab by mouth every morning.     • Coenzyme Q10 (COQ10 PO) Take 2 Tabs by mouth every morning.     • CHONDROITIN SULFATE PO Take 2 Tabs by mouth every evening.     • colchicine (COLCRYS) 0.6 MG Tab Take 1 Tab by mouth 2 times a day. 60 Tab 2   • aspirin (ASA) 325 MG Tab Take 2 Tabs by mouth 3 times a day. 50 Tab 0   • therapeutic multivitamin-minerals (THERAGRAN-M) Tab Take 1 Tab by mouth every day.       No facility-administered encounter  "medications on file as of 2/7/2020.      Review of Systems   Constitutional: Negative for chills and fever.   HENT: Negative for congestion.    Eyes: Negative for blurred vision.   Respiratory: Negative for shortness of breath.    Cardiovascular: Negative for chest pain, palpitations, orthopnea, leg swelling and PND.   Gastrointestinal: Negative for abdominal pain.   Genitourinary: Negative for dysuria.   Musculoskeletal: Negative for joint pain and myalgias.   Skin: Negative for rash.   Neurological: Negative for dizziness and loss of consciousness.   Psychiatric/Behavioral: The patient does not have insomnia.         Objective:   /62 (BP Location: Left arm, Patient Position: Sitting, BP Cuff Size: Adult)   Pulse 82   Ht 1.6 m (5' 3\")   Wt 61.7 kg (136 lb)   SpO2 97%   BMI 24.09 kg/m²     Physical Exam   Constitutional: She is oriented to person, place, and time. She appears well-developed and well-nourished. No distress.   Eyes: Pupils are equal, round, and reactive to light. Conjunctivae and EOM are normal.   Neck: Normal range of motion. Neck supple. No JVD present.   Cardiovascular: Normal rate, regular rhythm, normal heart sounds and intact distal pulses. Exam reveals no gallop and no friction rub.   No murmur heard.  Pulses:       Carotid pulses are 2+ on the right side and 2+ on the left side.  Pulmonary/Chest: Effort normal and breath sounds normal. No accessory muscle usage. No respiratory distress. She has no wheezes. She has no rales.   Abdominal: Soft. She exhibits no distension and no mass. There is no hepatosplenomegaly. There is no tenderness.   Musculoskeletal:         General: No edema.   Neurological: She is alert and oriented to person, place, and time.   Skin: Skin is warm, dry and intact. No rash noted. No cyanosis. Nails show no clubbing.   Psychiatric: She has a normal mood and affect. Her behavior is normal.   Vitals reviewed.    EKG 02/07/2019 normal sinus rhythm, rate 74.  Early " repolarization.  Unchanged from previous tracing on 1/9/2019.    ECHOCARDIOGRAM 01/09/2020  No prior study is available for comparison.   Left ventricular ejection fraction is visually estimated to be 65%.  Right ventricular systolic pressure is estimated to be 20 mmHg.    Assessment:     1. History of pericarditis  EKG   2. Chest pain, precordial  EKG       Medical Decision Making:  Today's Assessment / Status / Plan:     Assessment  1.  Presumed diagnosis of pericarditis.  2.  History of preeclampsia.  3.  Reported history of mitral valve prolapse with no evidence of mitral valve prolapse by current echocardiogram.    Recommendation Discussion  1.  I explained the patient that there is no absolute convincing evidence of pericarditis.  Her only manifestation was intermittent chest pain.  No objective evidence of pericarditis.  2.  Nonetheless will complete therapy with 3 months of colchicine.  She has been on aspirin for 1 month and can discontinue it.  3.  I also explained and reviewed with her the images of her echocardiogram showing no evidence of mitral valve prolapse or mitral regurgitation.  4.  She does not wish to come back unless she has problems.

## 2020-03-27 ENCOUNTER — OFFICE VISIT (OUTPATIENT)
Dept: URGENT CARE | Facility: CLINIC | Age: 40
End: 2020-03-27
Payer: COMMERCIAL

## 2020-03-27 VITALS
OXYGEN SATURATION: 97 % | RESPIRATION RATE: 12 BRPM | WEIGHT: 130 LBS | BODY MASS INDEX: 23.04 KG/M2 | SYSTOLIC BLOOD PRESSURE: 124 MMHG | HEIGHT: 63 IN | HEART RATE: 92 BPM | TEMPERATURE: 97.5 F | DIASTOLIC BLOOD PRESSURE: 66 MMHG

## 2020-03-27 DIAGNOSIS — J02.9 SORE THROAT: ICD-10-CM

## 2020-03-27 DIAGNOSIS — J06.9 UPPER RESPIRATORY TRACT INFECTION, UNSPECIFIED TYPE: ICD-10-CM

## 2020-03-27 LAB
INT CON NEG: NORMAL
INT CON POS: NORMAL
S PYO AG THROAT QL: NEGATIVE

## 2020-03-27 PROCEDURE — 99214 OFFICE O/P EST MOD 30 MIN: CPT | Performed by: NURSE PRACTITIONER

## 2020-03-27 PROCEDURE — 87880 STREP A ASSAY W/OPTIC: CPT | Performed by: NURSE PRACTITIONER

## 2020-03-27 ASSESSMENT — ENCOUNTER SYMPTOMS
WHEEZING: 0
STRIDOR: 0
SHORTNESS OF BREATH: 0
EYE DISCHARGE: 0
MYALGIAS: 0
HEADACHES: 1
CHILLS: 0
SORE THROAT: 1
DIARRHEA: 0
COUGH: 0
VOMITING: 0
SWOLLEN GLANDS: 1
NAUSEA: 0
FEVER: 0
TROUBLE SWALLOWING: 0

## 2020-03-27 ASSESSMENT — FIBROSIS 4 INDEX: FIB4 SCORE: 0.6

## 2020-03-27 NOTE — LETTER
March 27, 2020         Patient: Emeli Justin   YOB: 1980   Date of Visit: 3/27/2020           To Whom it May Concern:    Emeli Justin was seen in my clinic on 3/27/2020. Please excuse her from work. Please refer to the [email or web address] website regarding guidelines for when she may return.    If you have any questions or concerns, please don't hesitate to call.        Sincerely,           SHOAIB Bertrand  Electronically Signed

## 2020-03-28 NOTE — PROGRESS NOTES
"Subjective:   Emeli Justin is a 39 y.o. female who presents for Sore Throat (headache x2 days)        Pharyngitis    This is a new problem. The current episode started in the past 7 days (Started 2 days ago). The problem has been unchanged. The pain is moderate. Associated symptoms include congestion, headaches and swollen glands. Pertinent negatives include no coughing, diarrhea, ear discharge, ear pain, shortness of breath, stridor, trouble swallowing or vomiting. Treatments tried: Started taking Augmentin. The treatment provided mild relief.    States she started Augmentin BID for 6 days.    Review of Systems   Constitutional: Positive for malaise/fatigue. Negative for chills and fever.   HENT: Positive for congestion and sore throat. Negative for ear discharge, ear pain and trouble swallowing.    Eyes: Negative for discharge.   Respiratory: Negative for cough, shortness of breath, wheezing and stridor.    Cardiovascular: Negative for chest pain.   Gastrointestinal: Negative for diarrhea, nausea and vomiting.   Musculoskeletal: Negative for myalgias.   Skin: Negative for rash.   Neurological: Positive for headaches.   All other systems reviewed and are negative.    PMH:  has a past medical history of Mitral valve prolapse (2015) and Pre-eclampsia (2014).  ALLERGIES: No Known Allergies    Patient's PMH, SocHx, SurgHx, FamHx, Drug allergies and medications reviewed.     Objective:   /66   Pulse 92   Temp 36.4 °C (97.5 °F) (Temporal)   Resp 12   Ht 1.6 m (5' 3\")   Wt 59 kg (130 lb)   SpO2 97%   BMI 23.03 kg/m²   Physical Exam  Vitals signs reviewed.   Constitutional:       Appearance: She is well-developed.   HENT:      Head: Normocephalic.      Right Ear: Ear canal normal. A middle ear effusion is present. Tympanic membrane is not perforated or erythematous.      Left Ear: Tympanic membrane and ear canal normal.  No middle ear effusion. Tympanic membrane is not perforated or erythematous.      " Nose: Mucosal edema present. No rhinorrhea.      Right Sinus: No maxillary sinus tenderness or frontal sinus tenderness.      Left Sinus: No maxillary sinus tenderness or frontal sinus tenderness.      Mouth/Throat:      Lips: Pink.      Mouth: Mucous membranes are moist.      Pharynx: Oropharynx is clear. Uvula midline. Posterior oropharyngeal erythema present. No oropharyngeal exudate or uvula swelling.      Tonsils: No tonsillar exudate. 1+ on the right. 1+ on the left.   Eyes:      General: Lids are normal.      Extraocular Movements: Extraocular movements intact.      Conjunctiva/sclera: Conjunctivae normal.      Pupils: Pupils are equal, round, and reactive to light.   Neck:      Musculoskeletal: Normal range of motion.      Thyroid: No thyromegaly.   Cardiovascular:      Rate and Rhythm: Normal rate and regular rhythm.      Heart sounds: Normal heart sounds.   Pulmonary:      Effort: Pulmonary effort is normal. No tachypnea, bradypnea, accessory muscle usage, prolonged expiration or respiratory distress.      Breath sounds: Normal breath sounds. No wheezing.   Lymphadenopathy:      Head:      Right side of head: No submandibular or tonsillar adenopathy.      Left side of head: Submandibular adenopathy present. No tonsillar adenopathy.   Skin:     General: Skin is warm and dry.      Findings: No rash.   Neurological:      Mental Status: She is alert and oriented to person, place, and time.   Psychiatric:         Mood and Affect: Mood normal.         Speech: Speech normal.         Behavior: Behavior normal. Behavior is cooperative.         Thought Content: Thought content normal.         Judgment: Judgment normal.           Assessment/Plan:   Assessment    1. Sore throat  POCT Rapid Strep A   2. Upper respiratory tract infection, unspecified type       Rapid strep negative. Advised to continue with Augmentin as prior. Patient given handout on respiratory isolation for at home. Requesting work note - provided,  but advised to refer to the [facility] website for information regarding this.     Differential diagnosis, natural history, supportive care, and indications for immediate follow-up discussed.     **Please note that all invasive procedures during this visit were performed by myself and/or the Medical Assistant under the supervision of the PA or MD in office**

## 2020-04-03 ENCOUNTER — EH NON-PROVIDER (OUTPATIENT)
Dept: OCCUPATIONAL MEDICINE | Facility: CLINIC | Age: 40
End: 2020-04-03

## 2020-04-03 PROCEDURE — 94375 RESPIRATORY FLOW VOLUME LOOP: CPT | Performed by: NURSE PRACTITIONER

## 2020-06-05 ENCOUNTER — OCCUPATIONAL MEDICINE (OUTPATIENT)
Dept: URGENT CARE | Facility: CLINIC | Age: 40
End: 2020-06-05
Payer: COMMERCIAL

## 2020-06-05 VITALS
SYSTOLIC BLOOD PRESSURE: 118 MMHG | HEART RATE: 80 BPM | DIASTOLIC BLOOD PRESSURE: 70 MMHG | HEIGHT: 63 IN | BODY MASS INDEX: 23.03 KG/M2 | TEMPERATURE: 97.2 F | OXYGEN SATURATION: 97 %

## 2020-06-05 DIAGNOSIS — Z02.1 PRE-EMPLOYMENT DRUG SCREENING: ICD-10-CM

## 2020-06-05 DIAGNOSIS — S39.012A STRAIN OF LUMBAR REGION, INITIAL ENCOUNTER: ICD-10-CM

## 2020-06-05 PROCEDURE — 99213 OFFICE O/P EST LOW 20 MIN: CPT | Mod: 29 | Performed by: PHYSICIAN ASSISTANT

## 2020-06-05 PROCEDURE — 82075 ASSAY OF BREATH ETHANOL: CPT | Mod: 29 | Performed by: PHYSICIAN ASSISTANT

## 2020-06-05 PROCEDURE — 80305 DRUG TEST PRSMV DIR OPT OBS: CPT | Mod: 29 | Performed by: PHYSICIAN ASSISTANT

## 2020-06-05 ASSESSMENT — ENCOUNTER SYMPTOMS
NAUSEA: 0
EYE DISCHARGE: 0
BACK PAIN: 1
VOMITING: 0
SHORTNESS OF BREATH: 0
COUGH: 0
SORE THROAT: 0
HEADACHES: 0
FEVER: 0
EYE REDNESS: 0

## 2020-06-05 NOTE — PROGRESS NOTES
Subjective:      Emeli Justin is a 39 y.o. female who presents with Back Pain (x12 days, lower back pain)        HPI  The patient presents to clinic for initial Workmen's Comp. evaluation.    DOI: 5/23/2020 - The patient works as an RN at [employer].  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  The patient states she was pulling on the bed sheets when she developed the pain to her low back.  The patient states the pain is located to the right side of her low back.  The patient states she is experiencing intermittent tingling to the right lower extremity.  She reports no radiation of pain.  No numbness or weakness.  No incontinence of bladder/bowel.  No perianal numbness.  No paresthesias.  The patient initially thought the pain would improve.  The patient states she took it easy during her days off.  She also states she went to her chiropractor for adjustment.  The patient reports continued low back pain.  The patient states her low back pain is exacerbated while at work due to constant moving and lifting hospitalized patients.  The patient has been taking Tylenol for her current symptoms.  The patient reports a history of a herniated disc to her low back, but states this does not cause pain.  The patient states she also works a second job, but states she has not worked since the onset of COVID.    PMH: Reviewed in Epic, no pertinent findings.  MEDS: Reviewed in Epic.  ALLERGIES: Reviewed in Epic.  SURGHX: Reviewed in Epic.  SOCHX: Reviewed in Epic.  FH: Family history was reviewed, no pertinent findings to report.    Review of Systems   Constitutional: Negative for fever.   HENT: Negative for congestion, ear pain and sore throat.    Eyes: Negative for discharge and redness.   Respiratory: Negative for cough and shortness of breath.    Cardiovascular: Negative for chest pain and leg swelling.   Gastrointestinal: Negative for nausea and  "vomiting.   Musculoskeletal: Positive for back pain. Negative for joint pain.   Skin: Negative for rash.   Neurological: Negative for headaches.          Objective:     /70 (BP Location: Left arm, Patient Position: Sitting, BP Cuff Size: Adult)   Pulse 80   Temp 36.2 °C (97.2 °F) (Temporal)   Ht 1.6 m (5' 3\")   SpO2 97%   BMI 23.03 kg/m²      Physical Exam  Constitutional:       General: She is not in acute distress.     Appearance: Normal appearance. She is well-developed. She is not ill-appearing.   HENT:      Head: Normocephalic and atraumatic.      Right Ear: External ear normal.      Left Ear: External ear normal.      Nose: Nose normal.   Eyes:      Extraocular Movements: Extraocular movements intact.      Conjunctiva/sclera: Conjunctivae normal.   Neck:      Musculoskeletal: Normal range of motion and neck supple.   Cardiovascular:      Rate and Rhythm: Normal rate.   Pulmonary:      Effort: Pulmonary effort is normal.   Musculoskeletal:      Comments:   Lumbar Spine:  Tenderness to the right paraspinal region of the lumbar spine.  No swelling.  No ecchymosis.  No erythema.  No midline/bony tenderness.  No palpable step-off.  ROM intact -patient demonstrates full active range of motion; she easily moves from seated to standing  Neurovascular intact  Strength 5/5 -equal bilateral lower extremities  Normal gait  Straight Leg Raise: Negative   Skin:     General: Skin is warm and dry.   Neurological:      Mental Status: She is alert and oriented to person, place, and time.                 Assessment/Plan:     1. Strain of lumbar region, initial encounter    Differential diagnoses, supportive care, and indications for immediate follow-up discussed with patient.   Instructed to return to clinic or nearest emergency department for any change in condition, further concerns, or worsening of symptoms.    Plan:  Light duty work restrictions - D39 an C4 provided   OTC NSAIDs for pain/discomfort  Alternate ice " and heat to the affected area for symptomatic relief  Home stretches as discussed in clinic  Follow-up in 5 days for re-assessment   Return to clinic or go to the ED if symptoms worsen, or if the patient should develop worsening/increasing back pain, radiation of pain, numbness, tingling, or weakness to the lower extremities, incontinence of bladder/bowel, paresthesias, difficulty walking, fever/chills, and/or any concerning symptoms.     Discussed plan with the patient, and she agrees to the above.     Please note that this dictation was created using voice recognition software. I have made every reasonable attempt to correct obvious errors, but I expect that there may be errors of grammar and possibly content that I did not discover before finalizing the note.

## 2020-06-05 NOTE — LETTER
[facility]  [address] [facility] [place] [address]  Phone:  [phone] - Fax:  [phone]   [facility] Progress Report and Disability Certification  Date of Service: 6/5/2020   No Show:  No  Date / Time of Next Visit: 6/10/2020   Claim Information   Patient Name: Emeli Justin  Claim Number:     Employer: [employer]  Date of Injury: 5/23/2020     Insurer / TPA: Workers Choice  ID / SSN:     Occupation: registered nurse   Diagnosis: The encounter diagnosis was Strain of lumbar region, initial encounter.    Medical Information   Related to Industrial Injury? Yes    Subjective Complaints:  The patient presents to clinic for initial Workmen's Comp. evaluation.     DOI: 5/23/2020 - The patient works as an RN at [employer].  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  The patient states she was pulling on the bed sheets when she developed the pain to her low back.  The patient states the pain is located to the right side of her low back.  The patient states she is experiencing intermittent tingling to the right lower extremity.  She reports no radiation of pain.  No numbness or weakness.  No incontinence of bladder/bowel.  No perianal numbness.  No paresthesias.  The patient initially thought the pain would improve.  The patient states she took it easy during her days off.  She also states she went to her chiropractor for adjustment.  The patient reports continued low back pain.  The patient states her low back pain is exacerbated while at work due to constant moving and lifting hospitalized patients.  The patient has been taking Tylenol for her current symptoms.  The patient reports a history of a herniated disc to her low back, but states this does not cause pain.  The patient states she also works a second job, but states she has not worked since the onset of COVID.   Objective  Findings:   Lumbar Spine:  Tenderness to the right paraspinal region of the lumbar spine.  No swelling.  No ecchymosis.  No erythema.  No midline/bony tenderness.  No palpable step-off.  ROM intact -patient demonstrates full active range of motion; she easily moves from seated to standing  Neurovascular intact  Strength 5/5 -equal bilateral lower extremities  Normal gait  Straight Leg Raise: Negative    Pre-Existing Condition(s): The patient reports a history of a herniated disc to her low back, but states this does not cause pain.   Assessment:   Initial Visit    Status: Additional Care Required  Permanent Disability:     Plan:      Diagnostics:      Comments:       Disability Information   Status: Released to Restricted Duty    From:  2020  Through: 6/10/2020 Restrictions are: Temporary   Physical Restrictions   Sitting:    Standing:    Stooping:  < or = to 1 hr/day Bending:  < or = to 1 hr/day   Squatting:  < or = to 1 hr/day Walking:    Climbin hrs/day Pushing:  < or = to 1 hr/day   Pulling:  < or = to 1 hr/day Other:    Reaching Above Shoulder (L):   Reaching Above Shoulder (R):       Reaching Below Shoulder (L):    Reaching Below Shoulder (R):      Not to exceed Weight Limits   Carrying(hrs):   Weight Limit(lb): < or = to 10 pounds Lifting(hrs):   Weight  Limit(lb): < or = to 10 pounds   Comments: Plan:  Light duty work restrictions - D39 an C4 provided   OTC NSAIDs for pain/discomfort  Alternate ice and heat to the affected area for symptomatic relief  Home stretches as discussed in clinic  Follow-up in 5 days for re-assessment   Return to clinic or go to the ED if symptoms worsen, or if the patient should develop worsening/increasing back pain, radiation of pain, numbness, tingling, or weakness to the lower extremities, incontinence of bladder/bowel, paresthesias, difficulty walking, fever/chills, and/or any concerning symptoms.     Repetitive Actions   Hands: i.e. Fine Manipulations from Grasping:      Feet: i.e. Operating Foot Controls:     Driving / Operate Machinery:     Physician Name: Lynne Talbot P.A.-C. Physician Signature: LYNNE Banks P.A.-C. e-Signature: Dr. Nelson Gross, Medical Director   Clinic Name / Location: [address] [address] Clinic Phone Number: Dept: [phone]   Appointment Time: 9:15 Am Visit Start Time: 9:49 AM   Check-In Time:  9:17 Am Visit Discharge Time: 10:31 AM   Original-Treating Physician or Chiropractor    Page 2-Insurer/TPA    Page 3-Employer    Page 4-Employee

## 2020-06-05 NOTE — LETTER
"EMPLOYEE’S CLAIM FOR COMPENSATION/ REPORT OF INITIAL TREATMENT  FORM C-4    EMPLOYEE’S CLAIM - PROVIDE ALL INFORMATION REQUESTED   First Name  Emeli Last Name  Nhan Birthdate                    1980                Sex  female Claim Number   Home Address  [address]  Age  39 y.o. 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  [address]    Date of Injury  5/23/2020               Hour of Injury  3:30 AM Date Employer Notified  5/28/2020 Last Day of Work after Injury or Occupational Disease  6/5/2020 Supervisor to Whom Injury Reported  delmis andersen    Address or Location of Accident (if applicable)  [id number]   What were you doing at the time of accident? (if applicable)  boosting a patient     How did this injury or occupational disease occur? (Be specific an answer in detail. 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   Witnesses to the Accident  shona ( cna)       Nature of Injury or Occupational Disease  Strain  Part(s) of Body Injured or Affected  Lower Back Area (Lumbar Area & Lumbo-Sacral), N/A, N/A    I certify that the above is true and correct to the best of my knowledge and that I have provided this information in " order to obtain the benefits of [place]  I hereby authorize any physician, chiropractor, surgeon, practitioner, or other person, any hospital, including [facility] or [facility] hospital, any medical service organization, any insurance company, or other institution or organization to release to each other, any medical or other information, including benefits paid or payable, pertinent to this injury or disease, except information relative to diagnosis, treatment and/or counseling for AIDS, psychological conditions, alcohol or controlled substances, for which I must give specific authorization.  A Photostat of this authorization shall be as valid as the original.     Date   Place   Employee’s Signature   THIS REPORT MUST BE COMPLETED AND MAILED WITHIN 3 WORKING DAYS OF TREATMENT   Place  [facility]  Name of Facility  [facility]   Date  6/5/2020 Diagnosis  (S39.012A) Strain of lumbar region, initial encounter Is there evidence the injured employee was under the influence of alcohol and/or another controlled substance at the time of accident?   Hour  9:49 AM Description of Injury or Disease  The encounter diagnosis was Strain of lumbar region, initial encounter. No   Treatment  Plan:  Light duty work restrictions - D39 an C4 provided   OTC NSAIDs for pain/discomfort  Alternate ice and heat to the affected area for symptomatic relief  Home stretches as discussed in clinic  Follow-up in 5 days for re-assessment   Return to clinic or go to the ED if symptoms worsen, or if the patient should develop worsening/increasing back pain, radiation of pain, numbness, tingling, or weakness to the lower extremities, incontinence of bladder/bowel, paresthesias, difficulty walking, fever/chills, and/or any concerning symptoms.   Have you advised the patient to remain off work five days or more? No    "  X-Ray Findings    Comments:n/a   If Yes   From Date  To Date      From information given by the employee, together with medical evidence, can you directly connect this injury or occupational disease as job incurred?  Yes If No Full Duty No Modified Duty  Yes   Is additional medical care by a physician indicated?  Yes If Modified Duty, Specify any Limitations / Restrictions  See D39   Do you know of any previous injury or disease contributing to this condition or occupational disease?                            Yes  Comments:The patient reports a history of a herniated disc to her low back, but states this does not cause pain.    Date  6/5/2020 Print Doctor’s Name Lynne Talbot P.A.-C. I certify the employer’s copy of  this form was mailed on:   Address  [address] Insurer[address]s Use Only     [place]  [address]    Provider’s Tax ID Number  [id number] Telephone  Dept: [phone]        e-SignLYNNE TALBOT P.A.-C.   e-Signature: Dr. Nelson Gross,   Medical Director Degree  MD        ORIGINAL-TREATING PHYSICIAN OR CHIROPRACTOR    PAGE 2-INSURER/TPA    PAGE 3-EMPLOYER    PAGE 4-EMPLOYEE             Form C-4 (rev.10/07)              BRIEF DESCRIPTION OF RIGHTS AND BENEFITS  (Pursuant to [place] 616C.050)    Notice of Injury or Occupational Disease (Incident Report Form C-1): If an injury or occupational disease (OD) arises out of and in the course of employment, you must provide written notice to your employer as soon as practicable, but no later than 7 days after the accident or OD. Your employer shall maintain a sufficient supply of the required forms.    Claim for Compensation (Form C-4): If medical treatment is sought, the form C-4 is available at the place of initial treatment. A completed \"Claim for Compensation\" (Form C-4) must be filed within 90 days after an accident or OD. The treating physician or chiropractor must, within 3 working days after treatment, complete and mail to the " employer, the employer's insurer and third-party , the Claim for Compensation.    Medical Treatment: If you require medical treatment for your on-the-job injury or OD, you may be required to select a physician or chiropractor from a list provided by your workers’ compensation insurer, if it has contracted with an Organization for Managed Care (MCO) or Preferred Provider Organization (PPO) or providers of health care. If your employer has not entered into a contract with an MCO or PPO, you may select a physician or chiropractor from the Panel of Physicians and Chiropractors. Any medical costs related to your industrial injury or OD will be paid by your insurer.    Temporary Total Disability (TTD): If your doctor has certified that you are unable to work for a period of at least 5 consecutive days, or 5 cumulative days in a 20-day period, or places restrictions on you that your employer does not accommodate, you may be entitled to TTD compensation.    Temporary Partial Disability (TPD): If the wage you receive upon reemployment is less than the compensation for TTD to which you are entitled, the insurer may be required to pay you TPD compensation to make up the difference. TPD can only be paid for a maximum of 24 months.    Permanent Partial Disability (PPD): When your medical condition is stable and there is an indication of a PPD as a result of your injury or OD, within 30 days, your insurer must arrange for an evaluation by a rating physician or chiropractor to determine the degree of your PPD. The amount of your PPD award depends on the date of injury, the results of the PPD evaluation and your age and wage.    Permanent Total Disability (PTD): If you are medically certified by a treating physician or chiropractor as permanently and totally disabled and have been granted a PTD status by your insurer, you are entitled to receive monthly benefits not to exceed 66 2/3% of your average monthly wage. The  amount of your PTD payments is subject to reduction if you previously received a PPD award.    Vocational Rehabilitation Services: You may be eligible for vocational rehabilitation services if you are unable to return to the job due to a permanent physical impairment or permanent restrictions as a result of your injury or occupational disease.    Transportation and Per Wm Reimbursement: You may be eligible for travel expenses and per wm associated with medical treatment.    Reopening: You may be able to reopen your claim if your condition worsens after claim closure.    Appeal Process: If you disagree with a written determination issued by the insurer or the insurer does not respond to your request, you may appeal to the Department of Administration, , by following the instructions contained in your determination letter. You must appeal the determination within 70 days from the date of the determination letter at [address], [address], [place], [place] [address], or [address], [address], [place], [place] [address]. If you disagree with the  decision, you may appeal to the Department of Administration, . You must file your appeal within 30 days from the date of the  decision letter at [address], [address], [place], [place] [address], or [address], [address], [place], [place] [address]. If you disagree with a decision of an , you may file a petition for judicial review with the District Court. You must do so within 30 days of the Appeal Officer’s decision. You may be represented by an  at your own expense or you may contact the [facility] for possible representation.    [place]  for Injured Workers (NAIW): If you disagree with a  decision, you may request that NAIW represent you without charge at an  Hearing. For information regarding denial of benefits, you may  contact the [facility] at: [address], [address], [place] [address], [phone], or [address], [address], [place] [address], [phone]    To File a Complaint with the Division: If you wish to file a complaint with the  of the Division of Industrial Relations (DIR),  please contact the Workers’ Compensation Section, [address], [address], [place], [place] [address], telephone ([phone]) [phone], or [address], [address], [place], [place] [address], telephone [phone].    For assistance with Workers’ Compensation Issues: You may contact the Office of the Governor Consumer Health Assistance, [address] [place] [address], [address], [place], [place] [address], Toll Free 1-683.581.5228, Web site: http://govcha.[facility].nv., E-mail [email or web address].nv.                   __________________________________________________________________                                                     _________        Employee Name / Signature                                                                                                                                              Date                                                                                                                                                                                                     D-2 (rev. 06/18)

## 2020-06-08 ENCOUNTER — APPOINTMENT (OUTPATIENT)
Dept: MEDICAL GROUP | Facility: MEDICAL CENTER | Age: 40
End: 2020-06-08
Payer: COMMERCIAL

## 2020-06-10 ENCOUNTER — OCCUPATIONAL MEDICINE (OUTPATIENT)
Dept: URGENT CARE | Facility: CLINIC | Age: 40
End: 2020-06-10
Payer: COMMERCIAL

## 2020-06-10 VITALS
SYSTOLIC BLOOD PRESSURE: 116 MMHG | DIASTOLIC BLOOD PRESSURE: 76 MMHG | HEIGHT: 63 IN | HEART RATE: 86 BPM | TEMPERATURE: 98.1 F | BODY MASS INDEX: 24.52 KG/M2 | OXYGEN SATURATION: 95 % | RESPIRATION RATE: 16 BRPM | WEIGHT: 138.4 LBS

## 2020-06-10 DIAGNOSIS — S39.012D STRAIN OF LUMBAR REGION, SUBSEQUENT ENCOUNTER: ICD-10-CM

## 2020-06-10 PROCEDURE — 99213 OFFICE O/P EST LOW 20 MIN: CPT | Performed by: PHYSICIAN ASSISTANT

## 2020-06-10 ASSESSMENT — ENCOUNTER SYMPTOMS
TINGLING: 1
SENSORY CHANGE: 0
NUMBNESS: 0
FALLS: 0
LEG PAIN: 0
BACK PAIN: 1
BOWEL INCONTINENCE: 0

## 2020-06-10 ASSESSMENT — FIBROSIS 4 INDEX: FIB4 SCORE: 0.6

## 2020-06-10 NOTE — PROGRESS NOTES
"Subjective:      Emeli Justin is a 39 y.o. female who presents with Back Pain (WC, 5/23/20)       DOI: 5/23/2020 - Visit #2 today. Pt readily admits that she feels the same since last visit. She continues to report pain to the right side with intermittent radiation of pain down her leg- without noted weakness. She has tried chiropractor along with accupunture- with mild relief- however the pain then will shortly return. She has been back to work- however reports that she needed to ice the lower part of her back last night as it started to bother her. She denies any new injury. She is taking NSAIDs with mild relief.   Injury- copied from original visit- The patient works as an RN at [employer].  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  The patient states she was pulling on the bed sheets when she developed the pain to her low back.     Back Pain   This is a new problem. The current episode started 1 to 4 weeks ago. The problem occurs daily. The problem has been waxing and waning since onset. The pain is present in the lumbar spine. The quality of the pain is described as aching. The pain is moderate. The symptoms are aggravated by twisting and position. Associated symptoms include tingling. Pertinent negatives include no bladder incontinence, bowel incontinence, leg pain or numbness. Treatments tried: As above.  The treatment provided mild relief.       Review of Systems   Gastrointestinal: Negative for bowel incontinence.   Genitourinary: Negative for bladder incontinence.   Musculoskeletal: Positive for back pain. Negative for falls.   Neurological: Positive for tingling. Negative for sensory change and numbness.   All other systems reviewed and are negative.         Objective:     /76 (BP Location: Left arm, Patient Position: Sitting)   Pulse 86   Temp 36.7 °C (98.1 °F) (Temporal)   Resp 16   Ht 1.6 m (5' 2.99\")   Wt 62.8 " kg (138 lb 6.4 oz)   SpO2 95%   BMI 24.52 kg/m²      PMH: No pertinent past medical history to this problem  MEDS: Medications were reviewed in Epic  ALLERGIES: Allergies were reviewed in Epic  SOCHX: Works as a RN.   FH: No pertinent family history to this problem    Physical Exam  Vitals signs reviewed.   Constitutional:       General: She is not in acute distress.     Appearance: She is well-developed.   HENT:      Head: Normocephalic and atraumatic.      Mouth/Throat:      Pharynx: No oropharyngeal exudate.   Eyes:      Conjunctiva/sclera: Conjunctivae normal.      Pupils: Pupils are equal, round, and reactive to light.   Neck:      Musculoskeletal: Normal range of motion and neck supple.      Trachea: No tracheal deviation.   Cardiovascular:      Rate and Rhythm: Normal rate.   Pulmonary:      Effort: Pulmonary effort is normal. No respiratory distress.   Skin:     General: Skin is warm.   Neurological:      Mental Status: She is alert and oriented to person, place, and time.      Coordination: Coordination normal.   Psychiatric:         Behavior: Behavior normal.         Thought Content: Thought content normal.         Judgment: Judgment normal.         Spine- without midline tenderness, step-off or deformity. Without scoliosis or kyphosis. Tenderness over the right paraspinous aspect of the lumbar spine with fullness- without discrete spasm.  FROM with lateral bend, and flexion/extension. Sensation intact bilaterally, BLE motor 5/5 and symmetrical  strength. Gait- WNL without foot drop.          Assessment/Plan:       1. Strain of lumbar region, subsequent encounter    Referral to PT today.   Pt. Is to continue with light duty.   Continue with Ice and NSAIDs.   Will see [facility] in 10 days.   Please see D39 for further information.

## 2020-06-10 NOTE — LETTER
[facility]  [address] [place] [address]  Phone:  [phone] - Fax:  [phone]   [facility] Progress Report and Disability Certification  Date of Service: 6/10/2020   No Show:  No  Date / Time of Next Visit: 6/19/2020 9:00Am   Claim Information   Patient Name: Emeli Justin  Claim Number:     Employer: [employer]  Date of Injury: 5/23/2020     Insurer / TPA: Workers Choice  ID / SSN:     Occupation: registered nurse   Diagnosis: The encounter diagnosis was Strain of lumbar region, subsequent encounter.    Medical Information   Related to Industrial Injury? Yes    Subjective Complaints:   DOI: 5/23/2020 - Visit #2 today. Pt readily admits that she feels the same since last visit. She continues to report pain to the right side with intermittent radiation of pain down her leg- without noted weakness. She has tried chiropractor along with accupunture- with mild relief- however the pain then will shortly return. She has been back to work- however reports that she needed to ice the lower part of her back last night as it started to bother her. She denies any new injury. She is taking NSAIDs with mild relief.   Injury- copied from original visit- The patient works as an RN at [employer].  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  The patient states she was pulling on the bed sheets when she developed the pain to her low back.   Objective Findings: Spine- without midline tenderness, step-off or deformity. Without scoliosis or kyphosis. Tenderness over the right paraspinous aspect of the lumbar spine with fullness- without discrete spasm.  FROM with lateral bend, and flexion/extension. Sensation intact bilaterally, BLE motor 5/5 and symmetrical  strength. Gait- WNL without foot drop.      Pre-Existing Condition(s): HX of disc herniation- however denies residual pain from such.         Assessment:   Condition Same    Status: Additional Care Required  Permanent Disability:No    Plan:      Diagnostics:      Comments:       Disability Information   Status: Released to Restricted Duty    From:  6/10/2020  Through: 2020 Restrictions are:     Physical Restrictions   Sitting:    Standing:    Stoopin hrs/day Bendin hrs/day   Squattin hrs/day Walking:    Climbing:    Pushing:      Pulling:    Other:    Reaching Above Shoulder (L):   Reaching Above Shoulder (R):       Reaching Below Shoulder (L):    Reaching Below Shoulder (R):      Not to exceed Weight Limits   Carrying(hrs):   Weight Limit(lb): < or = to 10 pounds Lifting(hrs):   Weight  Limit(lb): < or = to 10 pounds   Comments: Continue with light duty at this time.   Referral to PT was made today. Follow up with [facility] - for recheck. RTC sooner for any worsening symptoms.   Continue with ice, light stretching, and NSAIDs.     Repetitive Actions   Hands: i.e. Fine Manipulations from Grasping:     Feet: i.e. Operating Foot Controls:     Driving / Operate Machinery:     Physician Name: Behzad Hernández P.A.-C. Physician Signature: BEHZAD Tran P.A.-C. e-Signature: Dr. Nelson Gross, Medical Director   Clinic Name / Location: [facility]  [address] [place] [address] Clinic Phone Number: Dept: [phone]   Appointment Time: 9:00 Am Visit Start Time: 9:00 AM   Check-In Time:  8:46 Am Visit Discharge Time: 9:38 AM   Original-Treating Physician or Chiropractor    Page 2-Insurer/TPA    Page 3-Employer    Page 4-Employee

## 2020-06-16 ENCOUNTER — OCCUPATIONAL MEDICINE (OUTPATIENT)
Dept: OCCUPATIONAL MEDICINE | Facility: CLINIC | Age: 40
End: 2020-06-16
Payer: COMMERCIAL

## 2020-06-16 VITALS
WEIGHT: 137 LBS | SYSTOLIC BLOOD PRESSURE: 116 MMHG | BODY MASS INDEX: 24.27 KG/M2 | HEIGHT: 63 IN | RESPIRATION RATE: 14 BRPM | DIASTOLIC BLOOD PRESSURE: 72 MMHG

## 2020-06-16 DIAGNOSIS — S39.012D STRAIN OF LUMBAR REGION, SUBSEQUENT ENCOUNTER: ICD-10-CM

## 2020-06-16 PROCEDURE — 99213 OFFICE O/P EST LOW 20 MIN: CPT | Performed by: NURSE PRACTITIONER

## 2020-06-16 RX ORDER — CYCLOBENZAPRINE HCL 5 MG
5 TABLET ORAL 3 TIMES DAILY PRN
Qty: 30 TAB | Refills: 0 | Status: SHIPPED | OUTPATIENT
Start: 2020-06-16 | End: 2020-07-21

## 2020-06-16 ASSESSMENT — FIBROSIS 4 INDEX: FIB4 SCORE: 0.6

## 2020-06-16 ASSESSMENT — ENCOUNTER SYMPTOMS
MYALGIAS: 1
NEUROLOGICAL NEGATIVE: 1
BACK PAIN: 1
CONSTITUTIONAL NEGATIVE: 1
RESPIRATORY NEGATIVE: 1
NERVOUS/ANXIOUS: 1
CARDIOVASCULAR NEGATIVE: 1

## 2020-06-16 ASSESSMENT — PAIN SCALES - GENERAL: PAINLEVEL: 3=SLIGHT PAIN

## 2020-06-16 NOTE — LETTER
[facility]  [address] [place] [address],   [address] [place] [address]  Phone:  [phone] - Fax:  [phone]   Occupational Health [facility] Progress Report and Disability Certification  Date of Service: 6/16/2020   No Show:  No  Date / Time of Next Visit: 6/30/2020 @ 8:45 am   Claim Information   Patient Name: Emeil Justin  Claim Number:     Employer: [employer]  Date of Injury: 5/23/2020     Insurer / TPA: Workers Choice  ID / SSN:     Occupation: registered nurse   Diagnosis: The encounter diagnosis was Strain of lumbar region, subsequent encounter.    Medical Information   Related to Industrial Injury? Yes    Subjective Complaints:  DOI: 5/23/2020 - The patient works as an RN at [employer].  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      Today no improvement. Pain is described as intermittent, triggered with sitting too long, and pinching isolated to the lower back. Patient denies saddle anesthesia and bowel or bladder changes. Patient states that she has tried acupuncture and chiropractor without improvement of symptoms. Patient is using ice and hot showers with moderate relief of symptoms. Patient states that she did not understand the previous work restrictions and continued on with her normal job functions which caused an increase in symptoms. Patient has been trying to schedule physical therapy sessions, but states that she is awaiting a phone call. Plan of care discussed with patient.    Objective Findings: Lumbar: No gross deformity noted. Mild tenderness to paraspinal musculature L3-S1 and right SI joint.  Neg decreased flexion or rotation.  Straight leg test neg bilaterally.  Able to heel/toe walk with minimal difficulty. Cranial nerves grossly intact.  Achilles and patellar reflexes 2+ bilaterally.   Sensation intact. No gait abnormalities      Pre-Existing Condition(s):     Assessment:    Condition Same    Status: Additional Care Required  Permanent Disability:No    Plan: PTMedication    Diagnostics:      Comments:  Follow-up in 2 weeks  Restricted duty   Physical therapy sessions as scheduled  Continue with gentle range of motion and stretching exercises as tolerated  Continue with OTC Tylenol/Ibuprofen as needed   Try OTC topical ointments/creams as needed   T  sameera cyclobenzaprine as prescribed DO NOT DRIVE or WORK while taking this medication  Continue rest, ice, and heat as needed       Disability Information   Status: Released to Restricted Duty    From:  6/16/2020  Through: 6/30/2020 Restrictions are: Temporary   Physical Restrictions   Sitting:    Standing:    Stooping:  < or = to 2 hrs/day Bending:  < or = to 2 hrs/day   Squatting:    Walking:    Climbing:    Pushing:      Pulling:    Other:    Reaching Above Shoulder (L):   Reaching Above Shoulder (R):       Reaching Below Shoulder (L):    Reaching Below Shoulder (R):      Not to exceed Weight Limits   Carrying(hrs):   Weight Limit(lb): < or = to 10 pounds Lifting(hrs):   Weight  Limit(lb): < or = to 10 pounds   Comments: Sedentary work with ability to sit, stand, and walk as needed for symptoms     Repetitive Actions   Hands: i.e. Fine Manipulations from Grasping:     Feet: i.e. Operating Foot Controls:     Driving / Operate Machinery:     Physician Name: SHOAIB Hills Physician Signature:   e-Signature: Dr. Nelson Gross, Medical Director   Clinic Name / Location: [address],   [address] [place] [address] Clinic Phone Number: Dept: [phone]   Appointment Time: 8:45 Am Visit Start Time: 8:54 AM   Check-In Time:  8:52 Am Visit Discharge Time:  9:33 am    Original-Treating Physician or Chiropractor    Page 2-Insurer/TPA    Page 3-Employer    Page 4-Employee

## 2020-06-16 NOTE — PROGRESS NOTES
"Subjective:      Emeli Justin is a 39 y.o. female who presents with Follow-Up (DOI: 5/23/2020 - back - same - RM 10)      DOI: 5/23/2020 - The patient works as an RN at [employer].  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      Today no improvement. Pain is described as intermittent, triggered with sitting too long, and pinching isolated to the lower back. Patient denies saddle anesthesia and bowel or bladder changes. Patient states that she has tried acupuncture and chiropractor without improvement of symptoms. Patient is using ice and hot showers with moderate relief of symptoms. Patient states that she did not understand the previous work restrictions and continued on with her normal job functions which caused an increase in symptoms. Patient has been trying to schedule physical therapy sessions, but states that she is awaiting a phone call. Plan of care discussed with patient.      HPI    Review of Systems   Constitutional: Negative.    Respiratory: Negative.    Cardiovascular: Negative.    Musculoskeletal: Positive for back pain and myalgias.   Skin: Negative.    Neurological: Negative.    Psychiatric/Behavioral: The patient is nervous/anxious.         ROS: All systems were reviewed on intake form, form was reviewed and signed. See scanned documents in media. Pertinent positives and negatives included in HPI.    PMH: No pertinent past medical history to this problem  MEDS: Medications were reviewed in Epic  ALLERGIES:   Allergies   Allergen Reactions   • Fish Oil      Full body rash     SOCHX: Works as RN at [employer]  FH: No pertinent family history to this problem   Objective:     /72   Resp 14   Ht 1.6 m (5' 3\")   Wt 62.1 kg (137 lb)   BMI 24.27 kg/m²      Physical Exam  Constitutional:       General: She is not in acute distress.     Appearance: Normal appearance. She is not ill-appearing.   Cardiovascular:      Rate and " Rhythm: Normal rate and regular rhythm.   Pulmonary:      Effort: Pulmonary effort is normal.   Musculoskeletal: Normal range of motion.         General: Tenderness and signs of injury present. No swelling or deformity.   Skin:     General: Skin is warm and dry.      Capillary Refill: Capillary refill takes less than 2 seconds.      Findings: No bruising or erythema.   Neurological:      General: No focal deficit present.      Mental Status: She is alert and oriented to person, place, and time.   Psychiatric:         Mood and Affect: Mood normal.         Behavior: Behavior normal.      Comments: Slightly anxious          Lumbar: No gross deformity noted. Mild tenderness to paraspinal musculature L3-S1 and right SI joint.  Neg decreased flexion or rotation.  Straight leg test neg bilaterally.  Able to heel/toe walk with minimal difficulty. Cranial nerves grossly intact.  Achilles and patellar reflexes 2+ bilaterally.   Sensation intact. No gait abnormalities          Assessment/Plan:       1. Strain of lumbar region, subsequent encounter    - cyclobenzaprine (FLEXERIL) 5 MG tablet; Take 1 Tab by mouth 3 times a day as needed.  Dispense: 30 Tab; Refill: 0    Follow-up in 2 weeks   Restricted duty   Physical therapy sessions as scheduled   Continue with gentle range of motion and stretching exercises as tolerated   Continue with OTC Tylenol/Ibuprofen as needed   Try OTC topical ointments/creams as needed   Take cyclobenzaprine as prescribed DO NOT DRIVE or WORK while taking this medication   Continue rest, ice, and heat as needed

## 2020-06-17 ENCOUNTER — PHYSICAL THERAPY (OUTPATIENT)
Dept: PHYSICAL THERAPY | Facility: REHABILITATION | Age: 40
End: 2020-06-17
Attending: PHYSICIAN ASSISTANT
Payer: COMMERCIAL

## 2020-06-17 DIAGNOSIS — M54.50 ACUTE LOW BACK PAIN, UNSPECIFIED BACK PAIN LATERALITY, UNSPECIFIED WHETHER SCIATICA PRESENT: ICD-10-CM

## 2020-06-17 DIAGNOSIS — S39.012D STRAIN OF LUMBAR REGION, SUBSEQUENT ENCOUNTER: ICD-10-CM

## 2020-06-17 PROCEDURE — 97012 MECHANICAL TRACTION THERAPY: CPT

## 2020-06-17 PROCEDURE — 97110 THERAPEUTIC EXERCISES: CPT

## 2020-06-17 PROCEDURE — 97161 PT EVAL LOW COMPLEX 20 MIN: CPT

## 2020-06-17 ASSESSMENT — ENCOUNTER SYMPTOMS
QUALITY: BURNING
QUALITY: ACHING
PAIN SCALE AT LOWEST: 2
PAIN SCALE: 2
PAIN SCALE AT HIGHEST: 6
ALLEVIATING FACTORS: WALKING
EXACERBATED BY: LIFTING
PAIN TIMING: INTERMITTENT
QUALITY: SHARP
QUALITY: DEEP
EXACERBATED BY: SITTING

## 2020-06-17 NOTE — OP THERAPY EVALUATION
Outpatient Physical Therapy  INITIAL EVALUATION    [facility] [address]  [address]  [facility] [address]  Phone:  [phone]  Fax:  [phone]    Date of Evaluation: 2020    Patient: Emeli Justin  YOB: 1980  MRN: [id number]     Referring Provider: Behzad Hernández P.A.-C.  [address]  [address]  [place] [address]   Referring Diagnosis Strain of lumbar region, subsequent encounter [S39.012D]     Time Calculation    Start time: 930  Stop time: 1030 Time Calculation (min): 60 minutes         Chief Complaint: Back Problem    Visit Diagnoses     ICD-10-CM   1. Acute low back pain, unspecified back pain laterality, unspecified whether sciatica present  M54.5   2. 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 She felt a pain in her back and then reports her pain continued.  Patient reports she was also having pain down her right leg.  She reports she continued to work without restriction initially.  She reports she is currently not working at all at this time.  She reports she is not having pain down her right leg, but is having warmth down her right leg.  She reports the ache is down the back of her leg and occasionally has symptoms down the front of her leg.  She reports she has symptoms that occasionally goes down to the bottom of her right foot.     Denies pain pain when she coughs or sneezes, denies incontinence or denies saddle parasthesia..       Sleep disturbance:  Not disrupted  Pain:     Current pain ratin    At best pain ratin    At worst pain ratin    Quality:  Deep, aching, burning and sharp    Pain timing:  Intermittent    Relieving factors:  Walking    Aggravating factors:  Sitting and lifting     Progression:  Improving  Social Support:     Lives in:  Apartment    Lives with: lives with 14 year old son.  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 She is currently working nights.      Patient Goals:     Patient goals for therapy:  Decreased pain      Past Medical History:   Diagnosis Date   • Mitral valve prolapse 2015   • Pre-eclampsia 2014     No past surgical history on file.  Social History     Tobacco Use   • Smoking status: Never Smoker   • Smokeless tobacco: Never Used   Substance Use Topics   • Alcohol use: Never     Frequency: Never     Family and Occupational History     Socioeconomic History   • Marital status:      Spouse name: Not on file   • Number of children: Not on file   • Years of education: Not on file   • Highest education level: Not on file   Occupational History   • Not on file       Objective     Neurological Testing     Reflexes   Left   Patellar (L4): normal (2+)  Achilles (S1): normal (2+)    Right   Patellar (L4): trace (1+)  Achilles (S1): normal (2+)    Myotome testing   Lumbar (left)   L4 (ankle dorsiflexors): 4-  L5 (great toe extension): 4        Dermatome testing   Lumbar (left)   All left lumbar dermatomes intact    Lumbar (right)   All right lumbar dermatomes intact    Active Range of Motion     Lumbar   Flexion: decreased  Extension: within functional limits  Left lateral flexion: within functional limits  Right lateral flexion: within functional limits  Left rotation: within functional limits  Right rotation: within functional limits    Additional Active Range of Motion Details  Pain with repeated flexion    Tests       Lumbar spine (right)     Positive slump.     Right Hip   SLR: Positive.     Additional Tests Details  45  degrees on R    Significant decrease in pain with manual lumbar traction        Therapeutic Exercises (CPT 20276):     1. Standing extension     2. Prone press  ups     8. UPOC due 8/12/20    Therapeutic Treatments and Modalities:     1. Mechanical Traction (CPT 65557), 60/40#  60/20 sec with heat x 15 min    Time-based treatments/modalities:    Physical Therapy Timed Treatment Charges  Therapeutic exercise minutes (CPT 32248): 8 minutes      Assessment, Response and Plan:   Impairments: abnormal muscle tone, impaired physical strength and lacks appropriate home exercise program    Assessment details:  Patient is a 39 year old female with no significant past medical history who reports complaints of low back pain after moving a patient at work. Patient presents with L/S derangement  syndrome with  poor posture  and poor body awareness with decreased lordosis.   Neurological exam is WNL except noted weakness of R L4 myotome with diminished R L4 DTR. Patient presents with axial load and flexion sensitivities. Patient centralized with Cindi extension protocol. Patient should do well if compliant with POC.  Barriers to therapy:  None  Prognosis: good    Goals:   Short Term Goals:   1. Patient will be independent in HEP with written instructions.   Short term goal time span:  2-4 weeks      Long Term Goals:    1. Patient will return to work without restrictions.   2. Patient will be able to sit for at least 45-60 minutes without being limited by pain.  3. Patient will score <12% impairment on the RMQ  4. Patient will be independent in upgraded HEP with written instructions.   Long term goal time span:  6-8 weeks    Plan:   Therapy options:  Physical therapy treatment to continue  Planned therapy interventions:  Manual Therapy (CPT 68267), E Stim Unattended (CPT 15902), Therapeutic Activities (CPT 95534), Therapeutic Exercise (CPT 39466) and Neuromuscular Re-education (CPT 61876)  Frequency:  2x week  Duration in  weeks:  8  Discussed with:  Patient      Functional Assessment Used  Vern Edward Low Back Pain and Disability Score: 20.83     Referring provider co-signature:  I have reviewed this plan of care and my co-signature certifies the need for services.    Certification Period: 06/17/2020 to  08/12/20    Physician Signature: ________________________________ Date: ______________

## 2020-06-19 ENCOUNTER — PHYSICAL THERAPY (OUTPATIENT)
Dept: PHYSICAL THERAPY | Facility: REHABILITATION | Age: 40
End: 2020-06-19
Attending: PHYSICIAN ASSISTANT
Payer: COMMERCIAL

## 2020-06-19 DIAGNOSIS — S39.012D STRAIN OF LUMBAR REGION, SUBSEQUENT ENCOUNTER: ICD-10-CM

## 2020-06-19 DIAGNOSIS — M54.50 ACUTE LOW BACK PAIN, UNSPECIFIED BACK PAIN LATERALITY, UNSPECIFIED WHETHER SCIATICA PRESENT: ICD-10-CM

## 2020-06-19 PROCEDURE — 97012 MECHANICAL TRACTION THERAPY: CPT

## 2020-06-19 PROCEDURE — 97110 THERAPEUTIC EXERCISES: CPT

## 2020-06-19 NOTE — OP THERAPY DAILY TREATMENT
Outpatient Physical Therapy  DAILY TREATMENT     [facility] [address].  [address]  [place] [address]  Phone:  [phone]  Fax:  [phone]    Date: 06/19/2020    Patient: Emeli Justin  YOB: 1980  MRN: [id number]     Time Calculation    Start time: 1000  Stop time: 1055 Time Calculation (min): 55 minutes         Chief Complaint: Back Problem    Visit #: 2    SUBJECTIVE:  I was sitting a lot but I got up every hour to do the standing extensions. I haven't really done the ones on my stomach very much. I feel it today in my back and my right leg; the tingling is there. I felt fine after traction but then I worked and I had some pain later. I'm not sure what caused it.     Goals:   Short Term Goals:   1. Patient will be independent in HEP with written instructions.   Short term goal time span:  2-4 weeks      Long Term Goals:    1. Patient will return to work without restrictions.   2. Patient will be able to sit for at least 45-60 minutes without being limited by pain.  3. Patient will score <12% impairment on the RMQ  4. Patient will be independent in upgraded HEP with written instructions.   Long term goal time span:  6-8 weeks    OBJECTIVE:  Current objective measures:           Therapeutic Exercises (CPT 79306):     1. Standing extension , verbal review hep    2. Prone press  ups , x15 with exhale and L/S decompression, centralized RLE pain to R back    3. Ball bridges, x15, hep    4. HS rolls, x15 with towel roll for TrA activation, hep    5. Piriformis stretch, [id number], hep; reduced R LBP    6. Lat pull downs, 2x15 pink TB, hep; fatigue    7. Rows, 2x15 with pink TB, hep; fatigue    8. UPOC due 8/12/20    Therapeutic Treatments and Modalities:     1. Mechanical Traction (CPT 93563), 60/40#  60/20 sec with heat x 15 min    Time-based treatments/modalities:    Physical Therapy Timed Treatment Charges  Therapeutic exercise minutes (CPT 30702): 40  minutes          ASSESSMENT:   Response to treatment:   Updated pt's hep with core and posterior chain strengthening. Able to completely abolish pt's s/s including LE and back today after completing rows and lat pull downs. Re-educated pt re: centralization and extension protocol; pt verbalized understanding. Second trial of mech traction at same parameters.      PLAN/RECOMMENDATIONS:   Plan for treatment: therapy treatment to continue next visit.  Planned interventions for next visit: continue with current treatment. Assess response to new hep and second trial traction. Review hep.

## 2020-06-25 ENCOUNTER — PHYSICAL THERAPY (OUTPATIENT)
Dept: PHYSICAL THERAPY | Facility: MEDICAL CENTER | Age: 40
End: 2020-06-25
Attending: PHYSICIAN ASSISTANT
Payer: COMMERCIAL

## 2020-06-25 DIAGNOSIS — M54.50 ACUTE LOW BACK PAIN, UNSPECIFIED BACK PAIN LATERALITY, UNSPECIFIED WHETHER SCIATICA PRESENT: ICD-10-CM

## 2020-06-25 DIAGNOSIS — S39.012D STRAIN OF LUMBAR REGION, SUBSEQUENT ENCOUNTER: ICD-10-CM

## 2020-06-25 PROCEDURE — 97014 ELECTRIC STIMULATION THERAPY: CPT

## 2020-06-25 PROCEDURE — 97110 THERAPEUTIC EXERCISES: CPT

## 2020-06-25 NOTE — OP THERAPY DAILY TREATMENT
Outpatient Physical Therapy  DAILY TREATMENT     [facility]  [address]  [place] [address]  Phone:  [phone]  Fax:  [phone]    Date: 06/25/2020    Patient: Emeli Justin  YOB: 1980  MRN: [id number]     Time Calculation    Start time: 1500  Stop time: 1550 Time Calculation (min): 50 minutes         Chief Complaint: No chief complaint on file.    Visit #: 3    SUBJECTIVE:  I am doing better overall. I am having less pain down my leg.     OBJECTIVE:    Therapeutic Exercises (CPT 67832):     1. Standing extension , verbal review hep    2. Prone press  ups , x15 with exhale and L/S decompression, centralized RLE pain to R back    3. Ball bridges, 10 sec x 10 times     4. Lumbar stabilization , with cuff bent knee fall out and marching     5. Piriformis stretch, [id number]    6. Ball wall squats , cues for neutral spine     7. Alt GH extension with lmbar stable     8. Standing hip hinge    9. Squats with bar with back isometric holds     10. UPOC due 8/12/20    Therapeutic Treatments and Modalities:     1. E Stim Unattended (CPT 45517), Russian stim 5 /5 x 15 min l/s paraspinals     Time-based treatments/modalities:    Physical Therapy Timed Treatment Charges  Therapeutic exercise minutes (CPT 54693): 30 minutes      ASSESSMENT:   Response to treatment: Poor lumbar hip stability. Weak posterior chain.    PLAN/RECOMMENDATIONS:   Plan for treatment: therapy treatment to continue next visit.  Planned interventions for next visit: continue with current treatment.

## 2020-06-30 ENCOUNTER — OCCUPATIONAL MEDICINE (OUTPATIENT)
Dept: OCCUPATIONAL MEDICINE | Facility: CLINIC | Age: 40
End: 2020-06-30
Payer: COMMERCIAL

## 2020-06-30 ENCOUNTER — PHYSICAL THERAPY (OUTPATIENT)
Dept: PHYSICAL THERAPY | Facility: MEDICAL CENTER | Age: 40
End: 2020-06-30
Attending: PHYSICIAN ASSISTANT
Payer: COMMERCIAL

## 2020-06-30 VITALS
OXYGEN SATURATION: 96 % | RESPIRATION RATE: 14 BRPM | HEIGHT: 63 IN | HEART RATE: 93 BPM | SYSTOLIC BLOOD PRESSURE: 110 MMHG | DIASTOLIC BLOOD PRESSURE: 60 MMHG | BODY MASS INDEX: 23.92 KG/M2 | TEMPERATURE: 98.9 F | WEIGHT: 135 LBS

## 2020-06-30 DIAGNOSIS — S39.012D STRAIN OF LUMBAR REGION, SUBSEQUENT ENCOUNTER: ICD-10-CM

## 2020-06-30 DIAGNOSIS — M54.50 ACUTE LOW BACK PAIN, UNSPECIFIED BACK PAIN LATERALITY, UNSPECIFIED WHETHER SCIATICA PRESENT: ICD-10-CM

## 2020-06-30 PROCEDURE — 99213 OFFICE O/P EST LOW 20 MIN: CPT | Performed by: NURSE PRACTITIONER

## 2020-06-30 PROCEDURE — 97140 MANUAL THERAPY 1/> REGIONS: CPT

## 2020-06-30 PROCEDURE — 97110 THERAPEUTIC EXERCISES: CPT

## 2020-06-30 PROCEDURE — 97014 ELECTRIC STIMULATION THERAPY: CPT

## 2020-06-30 ASSESSMENT — ENCOUNTER SYMPTOMS
MYALGIAS: 1
PSYCHIATRIC NEGATIVE: 1
NEUROLOGICAL NEGATIVE: 1
BACK PAIN: 1
CONSTITUTIONAL NEGATIVE: 1
CARDIOVASCULAR NEGATIVE: 1
RESPIRATORY NEGATIVE: 1

## 2020-06-30 ASSESSMENT — PAIN SCALES - GENERAL: PAINLEVEL: NO PAIN

## 2020-06-30 ASSESSMENT — FIBROSIS 4 INDEX: FIB4 SCORE: 0.6

## 2020-06-30 NOTE — OP THERAPY DAILY TREATMENT
Outpatient Physical Therapy  DAILY TREATMENT     [facility]  [address]  [place] [address]  Phone:  [phone]  Fax:  [phone]    Date: 06/30/2020    Patient: Emeli Justin  YOB: 1980  MRN: [id number]     Time Calculation    Start time: 1500  Stop time: 1545 Time Calculation (min): 45 minutes         Chief Complaint: No chief complaint on file.    Visit #: 4    SUBJECTIVE:  I am sore today and I have tingling down my leg.     OBJECTIVE:  Current objective measures:           Therapeutic Exercises (CPT 83732):     1. Standing extension     2. Prone press  ups , 2 x 10     3. Quadriped with leg extension , 10 sec x 10 times     4. Bridge with 10 sec holds , 2 x 10     5. Prone supermans on swiss ball , 1 min x 3 reps , relief with extension     6. Ball wall squats , cues for neutral spine     7. Alt GH extension with lmbar stable     8. Standing hip hinge    9. Squats with bar with back isometric holds     10. UPOC due 8/12/20      Therapeutic Exercise Summary: Patient has no pain after prone press ups and standing extension       Therapeutic Treatments and Modalities:     1. E Stim Unattended (CPT 70915), Russian stim 5 /5 x 15 min l/s paraspinals     2. Manual Therapy (CPT 54757), PA L5 unilateral Gr 3    Time-based treatments/modalities:    Physical Therapy Timed Treatment Charges  Manual therapy minutes (CPT 22484): 5 minutes  Therapeutic exercise minutes (CPT 20635): 33 minutes      ASSESSMENT:   Response to treatment: Poor lumbar stabilization noted. Patient has no pain after treatment.      PLAN/RECOMMENDATIONS:   Plan for treatment: therapy treatment to continue next visit.  Planned interventions for next visit: continue with current treatment.

## 2020-06-30 NOTE — LETTER
[address],   [address] [place] [address]  Phone:  [phone] - Fax:  [phone]   Occupational Health [facility] Progress Report and Disability Certification  Date of Service: 6/30/2020   No Show:  No  Date / Time of Next Visit: 7/21/2020 @ 8:15 AM   Claim Information   Patient Name: Emeli Justin  Claim Number:     Employer: [employer]  Date of Injury: 5/23/2020     Insurer / TPA: [employer]  ID / SSN:     Occupation: registered nurse   Diagnosis: The encounter diagnosis was Strain of lumbar region, subsequent encounter.    Medical Information   Related to Industrial Injury? 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       Today moderate improvement. Pain is described as intermittent, triggered with sitting too long, and triggered by pulling. Patient denies saddle anesthesia and bowel or bladder changes. Patient states that she has tried acupuncture and chiropractor without improvement of symptoms. Patient is using ice and hot showers with moderate relief of symptoms. Patient states that she has not taken the muscle relaxer's as it made her feel weird.  Patient has been attending physical therapy sessions with moderate relief. Patient is tolerating light duty. Plan of care discussed with patient.     Objective Findings: Lumbar: No gross deformity noted. Mild tenderness to paraspinal musculature L3-S1 and right SI joint.  Neg decreased flexion or rotation.  Straight leg test neg bilaterally.  Able to heel/toe walk with minimal difficulty. Cranial nerves grossly intact.  Achilles and patellar reflexes 2+ bilaterally.   Sensation intact. No gait abnormalities    Pre-Existing Condition(s):     Assessment:   Condition Improved    Status: Additional Care Required  Permanent Disability:No       Plan: PT    Diagnostics:      Comments:  Follow-up in 3 weeks   Restricted duty   Physical therapy sessions as scheduled   Continue with gentle range of motion and stretching exercises as tolerated   Continue with OTC Tylenol/Ibuprofen as needed   Try OTC topical ointments/creams as needed     Continue rest, ice, and heat as needed   Consider MRI if symptoms persist    Disability Information   Status: Released to Restricted Duty    From:  6/30/2020  Through: 7/21/2020 Restrictions are: Temporary   Physical Restrictions   Sitting:    Standing:    Stooping:  < or = to 2 hrs/day Bending:  < or = to 2 hrs/day   Squatting:    Walking:    Climbing:    Pushing:      Pulling:    Other:    Reaching Above Shoulder (L):   Reaching Above Shoulder (R):       Reaching Below Shoulder (L):    Reaching Below Shoulder (R):      Not to exceed Weight Limits   Carrying(hrs):   Weight Limit(lb): < or = to 10 pounds Lifting(hrs):   Weight  Limit(lb): < or = to 10 pounds   Comments: Sedentary work with ability to sit, stand, and walk as needed for symptoms     Repetitive Actions   Hands: i.e. Fine Manipulations from Grasping:     Feet: i.e. Operating Foot Controls:     Driving / Operate Machinery:     Provider Name:   SH[facility] [place] Physician Signature:  Physician Name:     Clinic Name / Location: [address] [place] [address] Clinic Phone Number: Dept: [phone]   Appointment Time: 8:45 Am Visit Start Time: 8:49 AM   Check-In Time:  8:40 Am Visit Discharge Time:  9:26 AM   Original-Treating Physician or Chiropractor    Page 2-Insurer/TPA    Page 3-Employer    Page 4-Employee

## 2020-06-30 NOTE — PROGRESS NOTES
"Subjective:      Emeli Justin is a 39 y.o. 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       Today moderate improvement. Pain is described as intermittent, triggered with sitting too long, and triggered by pulling. Patient denies saddle anesthesia and bowel or bladder changes. Patient states that she has tried acupuncture and chiropractor without improvement of symptoms. Patient is using ice and hot showers with moderate relief of symptoms. Patient states that she has not taken the muscle relaxer's as it made her feel weird.  Patient has been attending physical therapy sessions with moderate relief. Patient is tolerating light duty. Plan of care discussed with patient.       HPI    Review of Systems   Constitutional: Negative.    Respiratory: Negative.    Cardiovascular: Negative.    Musculoskeletal: Positive for back pain, joint pain and myalgias.   Skin: Negative.    Neurological: Negative.    Psychiatric/Behavioral: Negative.         SOCHX: Works as a RN at [employer]  FH: No pertinent family history to this problem.   Objective:     /60   Pulse 93   Temp 37.2 °C (98.9 °F) (Temporal)   Resp 14   Ht 1.6 m (5' 3\")   Wt 61.2 kg (135 lb)   SpO2 96%   BMI 23.91 kg/m²      Physical Exam  Constitutional:       General: She is not in acute distress.     Appearance: Normal appearance. She is not ill-appearing.   Cardiovascular:      Rate and Rhythm: Normal rate and regular rhythm.      Pulses: Normal pulses.   Pulmonary:      Effort: Pulmonary effort is normal.   Musculoskeletal: Normal range of motion.         General: Tenderness and signs of injury present. No swelling or deformity.   Skin:     General: Skin is warm and dry.      Capillary Refill: Capillary refill takes less than 2 seconds.     "  Findings: No bruising or erythema.   Neurological:      General: No focal deficit present.      Mental Status: She is alert and oriented to person, place, and time.      Cranial Nerves: No cranial nerve deficit.      Sensory: No sensory deficit.      Motor: No weakness.      Gait: Gait normal.      Deep Tendon Reflexes: Reflexes normal.   Psychiatric:         Mood and Affect: Mood normal.         Behavior: Behavior normal.         Lumbar: No gross deformity noted. Mild tenderness to paraspinal musculature L3-S1 and right SI joint.  Neg decreased flexion or rotation.  Straight leg test neg bilaterally.  Able to heel/toe walk with minimal difficulty. Cranial nerves grossly intact.  Achilles and patellar reflexes 2+ bilaterally.   Sensation intact. No gait abnormalities        Assessment/Plan:       1. Strain of lumbar region, subsequent encounter      Follow-up in 3 weeks   Restricted duty   Physical therapy sessions as scheduled   Continue with gentle range of motion and stretching exercises as tolerated   Continue with OTC Tylenol/Ibuprofen as needed   Try OTC topical ointments/creams as needed   Continue rest, ice, and heat as needed   Consider MRI if symptoms persist

## 2020-07-02 ENCOUNTER — PHYSICAL THERAPY (OUTPATIENT)
Dept: PHYSICAL THERAPY | Facility: MEDICAL CENTER | Age: 40
End: 2020-07-02
Attending: PHYSICIAN ASSISTANT
Payer: COMMERCIAL

## 2020-07-02 DIAGNOSIS — S39.012D STRAIN OF LUMBAR REGION, SUBSEQUENT ENCOUNTER: ICD-10-CM

## 2020-07-02 DIAGNOSIS — M54.50 ACUTE LOW BACK PAIN, UNSPECIFIED BACK PAIN LATERALITY, UNSPECIFIED WHETHER SCIATICA PRESENT: ICD-10-CM

## 2020-07-02 PROCEDURE — 97110 THERAPEUTIC EXERCISES: CPT

## 2020-07-02 PROCEDURE — 97140 MANUAL THERAPY 1/> REGIONS: CPT

## 2020-07-02 PROCEDURE — 97014 ELECTRIC STIMULATION THERAPY: CPT

## 2020-07-02 NOTE — OP THERAPY DAILY TREATMENT
Outpatient Physical Therapy  DAILY TREATMENT     [facility] Outpatient Physical Therapy  [address]  [place] [address]  Phone:  [phone]  Fax:  [phone]    Date: 07/02/2020    Patient: Emeli Justin  YOB: 1980  MRN: [id number]     Time Calculation    Start time: 1330  Stop time: 1430 Time Calculation (min): 60 minutes         Chief Complaint: No chief complaint on file.    Visit #: 5    SUBJECTIVE:  I am doing better overall     OBJECTIVE:  Current objective measures:  Vern Edward Low Back Pain and Disability Score: 16.67       Therapeutic Exercises (CPT 40214):     1. Standing extension     2. Bridge with one leg flexed ,  1x 10 , cues for hip stability     3. Quadriped with leg extension , 10 sec x 10 times     4. Bridge with 10 sec holds , 2 x 10     5. Prone supermans on swiss ball , 1 min x 3 reps , relief with extension     6. Ball wall squats , cues for neutral spine     7. Alt GH extension with lmbar stable     8. Quadriped extension with arms and legs     9. Modified side planks , added to HEP     10. UPOC due 8/12/20      Therapeutic Exercise Summary: Patient has no pain after prone press ups and standing extension   Access Code: [email or web address]   URL: [email or web address]   Date: 07/02/2020   Prepared by: Karine Davidson     Exercises  Side Plank on Knees - 10 reps - 3 sets - 1x daily - 7x weekly  Bird Dog - 10 reps - 3 sets - 1x daily - 7x weekly    Therapeutic Treatments and Modalities:     1. E Stim Unattended (CPT 63409), Russian stim 5 /5 x 15 min l/s paraspinals     2. Manual Therapy (CPT 32407), PA L5 unilateral Gr 3    Time-based treatments/modalities:    Physical Therapy Timed Treatment Charges  Manual therapy minutes (CPT 62505): 6 minutes  Therapeutic exercise minutes (CPT 56522): 33 minutes      ASSESSMENT:   Response to treatment: Patient is doing well overall. Patient is tolerating improved lumbar stability.       PLAN/RECOMMENDATIONS:   Plan for treatment: therapy treatment to continue next visit.  Planned interventions for next visit: continue with current treatment.

## 2020-07-02 NOTE — OP THERAPY PROGRESS SUMMARY
Outpatient Physical Therapy  PROGRESS SUMMARY NOTE      [facility] Outpatient Physical Therapy  [address]  [place] [address]  Phone:  [phone]  Fax:  [phone]    Date of Visit: 07/02/2020    Patient: Emeli Justin  YOB: 1980  MRN: [id number]     Referring Provider: Behzad Hernández P.A.-C.  [address]  [address]  [place] [address]   Referring Diagnosis Strain of muscle, fascia and tendon of lower back, subsequent encounter [S39.012D]     Visit Diagnoses     ICD-10-CM   1. Acute low back pain, unspecified back pain laterality, unspecified whether sciatica present  M54.5   2. Strain of lumbar region, subsequent encounter  S39.012D       Rehab Potential: excellent    Progress Report Period: 6/17/20 - 7/2/20    Functional Assessment Used  Vern Edward Low Back Pain and Disability Score: 16.67   (improved from 20)     Objective Findings and Assessment:   Patient progression towards goals: Patient is reporting decreased radicular symptoms and improved centralization of symptoms. Patient reports mild symptoms that are controlled by postural exercises.  Patient reports radicular symptoms are controlled by positioning.     Objective findings and assessment details: Improved RMQ score from 20% impairment on eval to 16% this session (5 visits).   Patient is able to sit for up to an hour without having symptoms at this time.      Patient continues to require cues for lumbar stability and cues for neutral spine.  Patient demonstrates ability to begin loading soon to enable strengthening progression.     Goals:   Short Term Goals:   1. Patient will report ability to control radicular symptoms independently greater than 75% of the time.   Short term goal time span:  2-4 weeks      Long Term Goals:    1. Patient will return to work without restrictions. Goal not met, continue with goal  2. Patient will be able to sit for at least 45-60 minutes without being  limited by pain.Goal Met, upgrade to sitting for up to 2 hours without pain   3. Patient will score <12% impairment on the RMQ Goal not met, but improved to 16% - Continue with goal   4. Patient will be independent in upgraded HEP with written instructions. In progress- continue to upgrade   Long term goal time span:  6-8 weeks    Plan:   Planned therapy interventions:  E Stim Unattended (CPT 67229), Therapeutic Exercise (CPT 90586), Therapeutic Activities (CPT 73747), Mechanical Traction (CPT 67748), Neuromuscular Re-education (CPT 72044) and Manual Therapy (CPT 41959)  Frequency:  2x week  Duration in weeks:  4      Referring provider co-signature:  I have reviewed this plan of care and my co-signature certifies the need for services.     Certification Period: 07/02/2020 to 08/27/20    Physician Signature: ________________________________ Date: ______________         needed assist/needs device

## 2020-07-07 ENCOUNTER — PHYSICAL THERAPY (OUTPATIENT)
Dept: PHYSICAL THERAPY | Facility: MEDICAL CENTER | Age: 40
End: 2020-07-07
Attending: PHYSICIAN ASSISTANT
Payer: COMMERCIAL

## 2020-07-07 DIAGNOSIS — S39.012D STRAIN OF LUMBAR REGION, SUBSEQUENT ENCOUNTER: ICD-10-CM

## 2020-07-07 DIAGNOSIS — M54.50 ACUTE LOW BACK PAIN, UNSPECIFIED BACK PAIN LATERALITY, UNSPECIFIED WHETHER SCIATICA PRESENT: ICD-10-CM

## 2020-07-07 PROCEDURE — 97140 MANUAL THERAPY 1/> REGIONS: CPT

## 2020-07-07 PROCEDURE — 97014 ELECTRIC STIMULATION THERAPY: CPT

## 2020-07-14 ENCOUNTER — PHYSICAL THERAPY (OUTPATIENT)
Dept: PHYSICAL THERAPY | Facility: MEDICAL CENTER | Age: 40
End: 2020-07-14
Attending: PHYSICIAN ASSISTANT
Payer: COMMERCIAL

## 2020-07-14 DIAGNOSIS — S39.012D STRAIN OF LUMBAR REGION, SUBSEQUENT ENCOUNTER: ICD-10-CM

## 2020-07-14 DIAGNOSIS — M54.50 ACUTE LOW BACK PAIN, UNSPECIFIED BACK PAIN LATERALITY, UNSPECIFIED WHETHER SCIATICA PRESENT: ICD-10-CM

## 2020-07-14 PROCEDURE — 97014 ELECTRIC STIMULATION THERAPY: CPT

## 2020-07-14 PROCEDURE — 97140 MANUAL THERAPY 1/> REGIONS: CPT

## 2020-07-14 PROCEDURE — 97110 THERAPEUTIC EXERCISES: CPT

## 2020-07-14 NOTE — OP THERAPY DAILY TREATMENT
Outpatient Physical Therapy  DAILY TREATMENT     [facility] Outpatient Physical Therapy  [address]  [place] [address]  Phone:  [phone]  Fax:  [phone]    Date: 07/14/2020    Patient: Emeli Justin  YOB: 1980  MRN: [id number]     Time Calculation    Start time: 0831  Stop time: 0930 Time Calculation (min): 59 minutes         Chief Complaint: No chief complaint on file.    Visit #: 7    SUBJECTIVE:  I am doing well overall     OBJECTIVE:    Therapeutic Exercises (CPT 37095):     1. Citizen of [place] ball bridge , upgraded to 2 minute holds     2. Side plank with knee bent with hip abductioon , cues for hip stability     3. Qaudriped with arm and leg extension     4. Prone lower trap on swis ball 2# , weight     5. Prone supermans on swiss ball , 1 min x 3 reps , relief with extension     6. Ball wall squats , cues for neutral spine     7. 90/90    8. Patient signed written and gave verbal consent for DN    9. DN to Left L4/L5 multifidus    10. Skin cleaned and prepped according to protocol    11. Estim to l/s x 10 min with heat     12. No adverse effects noted post treatment     Therapeutic Treatments and Modalities:     1. E Stim Unattended (CPT 59687), Russian stim 5 /5 x 15 min l/s paraspinals     2. Manual Therapy (CPT 24007), PA L5 unilateral Gr 3, IASTM to L/s paraspinals,     Time-based treatments/modalities:    Physical Therapy Timed Treatment Charges  Manual therapy minutes (CPT 26646): 15 minutes  Therapeutic exercise minutes (CPT 47737): 30 minutes      ASSESSMENT:   Response to treatment:   Patient is doing well overall. Decreased pain and centralization reported.  PLAN/RECOMMENDATIONS:   Plan for treatment: therapy treatment to continue next visit.  Planned interventions for next visit: continue with current treatment.

## 2020-07-16 DIAGNOSIS — S39.012D STRAIN OF LUMBAR REGION, SUBSEQUENT ENCOUNTER: ICD-10-CM

## 2020-07-20 ENCOUNTER — APPOINTMENT (OUTPATIENT)
Dept: PHYSICAL THERAPY | Facility: MEDICAL CENTER | Age: 40
End: 2020-07-20
Attending: PHYSICIAN ASSISTANT
Payer: COMMERCIAL

## 2020-07-21 ENCOUNTER — OCCUPATIONAL MEDICINE (OUTPATIENT)
Dept: OCCUPATIONAL MEDICINE | Facility: CLINIC | Age: 40
End: 2020-07-21
Payer: COMMERCIAL

## 2020-07-21 VITALS
TEMPERATURE: 97.4 F | OXYGEN SATURATION: 98 % | HEIGHT: 63 IN | HEART RATE: 82 BPM | BODY MASS INDEX: 23.74 KG/M2 | DIASTOLIC BLOOD PRESSURE: 90 MMHG | WEIGHT: 134 LBS | SYSTOLIC BLOOD PRESSURE: 126 MMHG

## 2020-07-21 DIAGNOSIS — M54.17 LUMBOSACRAL RADICULOPATHY: ICD-10-CM

## 2020-07-21 DIAGNOSIS — S39.012D STRAIN OF LUMBAR REGION, SUBSEQUENT ENCOUNTER: ICD-10-CM

## 2020-07-21 PROCEDURE — 99213 OFFICE O/P EST LOW 20 MIN: CPT | Performed by: NURSE PRACTITIONER

## 2020-07-21 RX ORDER — DICLOFENAC SODIUM 75 MG/1
75 TABLET, DELAYED RELEASE ORAL 2 TIMES DAILY
Qty: 60 TAB | Refills: 0 | Status: SHIPPED | OUTPATIENT
Start: 2020-07-21 | End: 2020-10-02

## 2020-07-21 RX ORDER — CYCLOBENZAPRINE HCL 5 MG
5 TABLET ORAL 3 TIMES DAILY PRN
Qty: 30 TAB | Refills: 0 | Status: SHIPPED | OUTPATIENT
Start: 2020-07-21 | End: 2020-10-02

## 2020-07-21 ASSESSMENT — FIBROSIS 4 INDEX: FIB4 SCORE: 0.6

## 2020-07-21 ASSESSMENT — ENCOUNTER SYMPTOMS
SENSORY CHANGE: 1
WEAKNESS: 0
MYALGIAS: 1
CARDIOVASCULAR NEGATIVE: 1
CONSTITUTIONAL NEGATIVE: 1
RESPIRATORY NEGATIVE: 1
TINGLING: 0
PSYCHIATRIC NEGATIVE: 1
BACK PAIN: 1

## 2020-07-21 NOTE — PROGRESS NOTES
"Subjective:      Emeli Justin is a 39 y.o. female who presents with Other (WC DOI: 5/23/2020 - back - better - RM 16)      DOI: 5/23/2020 - The patient works as an RN at [employer].  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       Today no improvement.  Patient states that last Sunday she woke up and she had severe burning that radiated from her right lower back all the way down past her thigh to her feet on her right side.  Patient states that he is unsure what has triggered this increase in symptoms states she has not been doing any heavy lifting and only doing the exercises provided through physical therapy.  Pain is described as intermittent, triggered with sitting too long, and triggered by pulling. Patient denies saddle anesthesia and bowel or bladder changes. Patient states that she has tried acupuncture and chiropractor without improvement of symptoms. Patient is using ice and hot showers with moderate relief of symptoms. Patient states that that her symptoms were so severe she did end up taking the cyclobenzaprine with moderate relief. Patient has been attending physical therapy sessions with moderate relief. Patient is tolerating light duty. Plan of care discussed with patient.        HPI    Review of Systems   Constitutional: Negative.    Respiratory: Negative.    Cardiovascular: Negative.    Musculoskeletal: Positive for back pain, joint pain and myalgias.   Skin: Negative.    Neurological: Positive for sensory change. Negative for tingling and weakness.   Psychiatric/Behavioral: Negative.         SOCHX: Works as a RN at [employer]  FH: No pertinent family history to this problem.   Objective:     /90   Pulse 82   Temp 36.3 °C (97.4 °F)   Ht 1.6 m (5' 3\")   Wt 60.8 kg (134 lb)   SpO2 98%   BMI 23.74 kg/m²      Physical Exam  Constitutional:       General: She is not in acute distress.     Appearance: Normal appearance. " She is not ill-appearing.   Cardiovascular:      Rate and Rhythm: Normal rate and regular rhythm.   Pulmonary:      Effort: Pulmonary effort is normal.   Musculoskeletal:         General: Tenderness and signs of injury present. No swelling or deformity.   Skin:     General: Skin is warm and dry.      Capillary Refill: Capillary refill takes less than 2 seconds.      Findings: No bruising or erythema.   Neurological:      General: No focal deficit present.      Mental Status: She is alert and oriented to person, place, and time.      Cranial Nerves: No cranial nerve deficit.      Sensory: No sensory deficit.      Motor: No weakness.      Gait: Gait normal.      Deep Tendon Reflexes: Reflexes normal.   Psychiatric:         Mood and Affect: Mood normal.         Behavior: Behavior normal.         Lumbar: No gross deformity noted. Mild tenderness to paraspinal musculature L3-S1 and right SI joint.  Neg decreased flexion or rotation.  Straight leg test neg left, positive on the right.  Able to heel/toe walk with minimal difficulty. Cranial nerves grossly intact.  Achilles and patellar reflexes 2+ bilaterally.   Sensation intact. No gait abnormalities        Assessment/Plan:       1. Strain of lumbar region, subsequent encounter    - diclofenac DR (VOLTAREN) 75 MG Tablet Delayed Response; Take 1 Tab by mouth 2 times a day.  Dispense: 60 Tab; Refill: 0  - cyclobenzaprine (FLEXERIL) 5 MG tablet; Take 1 Tab by mouth 3 times a day as needed.  Dispense: 30 Tab; Refill: 0  - MR-LUMBAR SPINE-W/O; Future  - REFERRAL TO RADIOLOGY    2. Lumbosacral radiculopathy    - diclofenac DR (VOLTAREN) 75 MG Tablet Delayed Response; Take 1 Tab by mouth 2 times a day.  Dispense: 60 Tab; Refill: 0  - cyclobenzaprine (FLEXERIL) 5 MG tablet; Take 1 Tab by mouth 3 times a day as needed.  Dispense: 30 Tab; Refill: 0  - MR-LUMBAR SPINE-W/O; Future  - REFERRAL TO RADIOLOGY    Follow-up in 3 weeks   Restricted duty   MRI ordered   Physical therapy  sessions as scheduled, additional sessions ordered   Continue with gentle range of motion and stretching exercises as tolerated   Take diclofenac as prescribed   Take cyclobenzaprine as prescribed DO NOT DRIVE or WORK while taking this medication   Try OTC topical ointments/creams as needed   Continue rest, ice, and heat as needed

## 2020-07-21 NOTE — LETTER
[facility]  [address] [place] [address],   [address] - [place] [address]  Phone:  [phone] - Fax:  [phone]   Occupational Health [facility] Progress Report and Disability Certification  Date of Service: 7/21/2020   No Show:  No  Date / Time of Next Visit: 8/14/2020 @ 8:15 AM   Claim Information   Patient Name: Emeli Justin  Claim Number:     Employer: [employer]  Date of Injury: 5/23/2020     Insurer / TPA: [employer]  ID / SSN:     Occupation: registered nurse   Diagnosis: There were no encounter diagnoses.    Medical Information   Related to Industrial Injury? Yes    Subjective Complaints:  DOI: 5/23/2020 - The patient works as an RN at [employer].  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       Today no improvement.  Patient states that last Sunday she woke up and she had severe burning that radiated from her right lower back all the way down past her thigh to her feet on her right side.  Patient states that he is unsure what has triggered this increase in symptoms states she has not been doing any heavy lifting and only doing the exercises provided through physical therapy.  Pain is described as intermittent, triggered with sitting too long, and triggered by pulling. Patient denies saddle anesthesia and bowel or bladder changes. Patient states that she has tried acupuncture and chiropractor without improvement of symptoms. Patient is using ice and hot showers with moderate relief of symptoms. Patient states that that her symptoms were so severe she did end up taking the cyclobenzaprine with moderate relief. Patient has been attending physical therapy sessions with moderate relief. Patient is tolerating light duty. Plan of care discussed with patient.      Objective Findings: Lumbar: No gross deformity noted. Mild tenderness to paraspinal musculature L3-S1 and right SI joint.  Neg decreased flexion  or rotation.  Straight leg test neg left, positive on the right.  Able to heel/toe walk with minimal difficulty. Cranial nerves grossly intact.  Achilles and patellar reflexes 2+ bilaterally.   Sensation intact. No gait abnormalities    Pre-Existing Condition(s):     Assessment:   Condition Same    Status: Additional Care Required  Permanent Disability:No    Plan: MedicationDiagnosticsPT    Diagnostics: MRI    Comments:  Follow-up in 3 weeks   Restricted duty   MRI ordered  Physical therapy sessions as scheduled, additional sessions ordered  Continue with gentle range of motion and stretching exercises as tolerated   Take diclofenac as prescribed  Take cyclobenzaprin  e as prescribed DO NOT DRIVE or WORK while taking this medication  Try OTC topical ointments/creams as needed   Continue rest, ice, and heat as needed       Disability Information   Status: Released to Restricted Duty    From:  7/21/2020  Through: 8/14/2020 Restrictions are: Temporary   Physical Restrictions   Sitting:    Standing:    Stooping:  < or = to 2 hrs/day Bending:  < or = to 2 hrs/day   Squatting:    Walking:    Climbing:    Pushing:      Pulling:    Other:    Reaching Above Shoulder (L):   Reaching Above Shoulder (R):       Reaching Below Shoulder (L):    Reaching Below Shoulder (R):      Not to exceed Weight Limits   Carrying(hrs):   Weight Limit(lb): < or = to 10 pounds Lifting(hrs):   Weight  Limit(lb): < or = to 10 pounds   Comments: Sedentary work with ability to sit, stand, and walk as needed for symptoms     Repetitive Actions   Hands: i.e. Fine Manipulations from Grasping:     Feet: i.e. Operating Foot Controls:     Driving / Operate Machinery:     Provider Name:   SH[place] Physician Signature:  Physician Name:     Clinic Name / Location: [address],   [address] [address] Clinic Phone Number: Dept: [phone]   Appointment Time: 8:15 Am Visit Start Time: 8:08 AM      Check-In Time:  8:02 Am Visit Discharge Time:  8:38 AM    Original-Treating Physician or Chiropractor    Page 2-Insurer/TPA    Page 3-Employer    Page 4-Employee

## 2020-07-22 ENCOUNTER — APPOINTMENT (OUTPATIENT)
Dept: PHYSICAL THERAPY | Facility: MEDICAL CENTER | Age: 40
End: 2020-07-22
Attending: PHYSICIAN ASSISTANT
Payer: COMMERCIAL

## 2020-07-27 ENCOUNTER — APPOINTMENT (OUTPATIENT)
Dept: PHYSICAL THERAPY | Facility: MEDICAL CENTER | Age: 40
End: 2020-07-27
Attending: PHYSICIAN ASSISTANT
Payer: COMMERCIAL

## 2020-07-28 ENCOUNTER — HOSPITAL ENCOUNTER (OUTPATIENT)
Dept: RADIOLOGY | Facility: MEDICAL CENTER | Age: 40
End: 2020-07-28
Attending: NURSE PRACTITIONER
Payer: COMMERCIAL

## 2020-07-28 DIAGNOSIS — M54.17 LUMBOSACRAL RADICULOPATHY: ICD-10-CM

## 2020-07-28 DIAGNOSIS — S39.012D STRAIN OF LUMBAR REGION, SUBSEQUENT ENCOUNTER: ICD-10-CM

## 2020-07-28 PROCEDURE — 72148 MRI LUMBAR SPINE W/O DYE: CPT

## 2020-07-29 ENCOUNTER — APPOINTMENT (OUTPATIENT)
Dept: PHYSICAL THERAPY | Facility: MEDICAL CENTER | Age: 40
End: 2020-07-29
Attending: PHYSICIAN ASSISTANT
Payer: COMMERCIAL

## 2020-07-30 ENCOUNTER — TELEPHONE (OUTPATIENT)
Dept: PHYSICAL THERAPY | Facility: MEDICAL CENTER | Age: 40
End: 2020-07-30

## 2020-07-30 ENCOUNTER — PHYSICAL THERAPY (OUTPATIENT)
Dept: PHYSICAL THERAPY | Facility: MEDICAL CENTER | Age: 40
End: 2020-07-30
Attending: NURSE PRACTITIONER
Payer: COMMERCIAL

## 2020-07-30 DIAGNOSIS — M54.50 ACUTE LOW BACK PAIN, UNSPECIFIED BACK PAIN LATERALITY, UNSPECIFIED WHETHER SCIATICA PRESENT: ICD-10-CM

## 2020-07-30 DIAGNOSIS — S39.012D STRAIN OF LUMBAR REGION, SUBSEQUENT ENCOUNTER: ICD-10-CM

## 2020-07-30 PROCEDURE — 97110 THERAPEUTIC EXERCISES: CPT

## 2020-07-30 PROCEDURE — 97014 ELECTRIC STIMULATION THERAPY: CPT

## 2020-07-30 NOTE — OP THERAPY DAILY TREATMENT
Paul,   I am seeing Emeli in PT and she is still having moderate radicular symptoms. Her MRI shows L5 pars defect and L5 disc protrusion/extrusion.  Would you mind referring her to neurosurgery or PM&R to consider an epidural?    I will continue to see her for a few more visits to focus on stabilization exercises.     Thank you,   Karine Davidson, PT, DPT

## 2020-07-30 NOTE — OP THERAPY DAILY TREATMENT
Outpatient Physical Therapy  DAILY TREATMENT     [facility] Outpatient Physical Therapy  [address]  [place] [address]  Phone:  [phone]  Fax:  [phone]    Date: 07/30/2020    Patient: Emeli Justin  YOB: 1980  MRN: [id number]     Time Calculation    Start time: 1530  Stop time: 1625 Time Calculation (min): 55 minutes         Chief Complaint: No chief complaint on file.    Visit #: 8    SUBJECTIVE:   I am having pain down my leg   OBJECTIVE:  Current objective measures:           Therapeutic Exercises (CPT 95311):     1. American ball bridge , upgraded to 2 minute holds     2. Side plank with knee bent with hip abductioon , cues for hip stability     3. Qaudriped with arm and leg extension     4. Prone lower trap on swis ball 2# , weight     6. Sidelying clams     7. 90/90, with cuff       Therapeutic Exercise Summary: Education regarding stabilization of lumbar spine and proper body mechanics.      Therapeutic Treatments and Modalities:     1. E Stim Unattended (CPT 95715), Russian stim 5 /5 x 15 min l/s paraspinals     Time-based treatments/modalities:    Physical Therapy Timed Treatment Charges  Therapeutic exercise minutes (CPT 66162): 38 minutes          ASSESSMENT:   Response to treatment: MRI reveals L5 pars defect and L5 disc dessication and stenosis.      PLAN/RECOMMENDATIONS:   Plan for treatment: therapy treatment to continue next visit.  Continued with lumbar stabilization and electrical stim   Planned interventions for next visit: continue with current treatment.

## 2020-08-04 DIAGNOSIS — S39.012D STRAIN OF LUMBAR REGION, SUBSEQUENT ENCOUNTER: ICD-10-CM

## 2020-08-04 DIAGNOSIS — M54.17 LUMBOSACRAL RADICULOPATHY: ICD-10-CM

## 2020-08-05 ENCOUNTER — PHYSICAL THERAPY (OUTPATIENT)
Dept: PHYSICAL THERAPY | Facility: MEDICAL CENTER | Age: 40
End: 2020-08-05
Attending: NURSE PRACTITIONER
Payer: COMMERCIAL

## 2020-08-05 DIAGNOSIS — S39.012D STRAIN OF LUMBAR REGION, SUBSEQUENT ENCOUNTER: ICD-10-CM

## 2020-08-05 DIAGNOSIS — M54.50 ACUTE LOW BACK PAIN, UNSPECIFIED BACK PAIN LATERALITY, UNSPECIFIED WHETHER SCIATICA PRESENT: ICD-10-CM

## 2020-08-05 PROCEDURE — 97110 THERAPEUTIC EXERCISES: CPT

## 2020-08-05 PROCEDURE — 97014 ELECTRIC STIMULATION THERAPY: CPT

## 2020-08-05 NOTE — OP THERAPY DAILY TREATMENT
Outpatient Physical Therapy  DAILY TREATMENT     [facility] Outpatient Physical Therapy  [address]  [place] [address]  Phone:  [phone]  Fax:  [phone]    Date: 08/05/2020    Patient: Emeli Justin  YOB: 1980  MRN: [id number]     Time Calculation    Start time: 0800  Stop time: 0845 Time Calculation (min): 45 minutes         Chief Complaint: back pain and sciatica  Visit #: 9    SUBJECTIVE:  pt's MD sent a new referral to neurosurgeon last week. She is waiting for  approval. Pt states her back and leg continue to bother her. Pt would like to start Pilates soon. I suggested she wait until she is done with PT.     OBJECTIVE:      Therapeutic Exercises (CPT 25342):     1. Nustep L3 6 minutes    2. Core exercises with cuff: leg extensions, fall outs    3. Core exercises with cuff: leg , 2 plates    4. Lateral stepping with orange tband for multifidus, 10x each side    Therapeutic Treatments and Modalities:     1. E Stim Unattended (CPT 92968), IFC and MHP to lumbar spine    Time-based treatments/modalities:    Physical Therapy Timed Treatment Charges  Therapeutic exercise minutes (CPT 65521): 30 minutes        ASSESSMENT:   Response to treatment: pt able to show good form with exercises without radicular symptoms.     PLAN/RECOMMENDATIONS:   Plan for treatment: therapy treatment to continue next visit.  Planned interventions for next visit: E-stim unattended (CPT 37938), manual therapy (CPT 81461) and therapeutic exercise (CPT 16950).

## 2020-08-13 ENCOUNTER — PHYSICAL THERAPY (OUTPATIENT)
Dept: PHYSICAL THERAPY | Facility: MEDICAL CENTER | Age: 40
End: 2020-08-13
Attending: NURSE PRACTITIONER
Payer: COMMERCIAL

## 2020-08-13 DIAGNOSIS — M54.50 ACUTE LOW BACK PAIN, UNSPECIFIED BACK PAIN LATERALITY, UNSPECIFIED WHETHER SCIATICA PRESENT: ICD-10-CM

## 2020-08-13 DIAGNOSIS — S39.012D STRAIN OF LUMBAR REGION, SUBSEQUENT ENCOUNTER: ICD-10-CM

## 2020-08-13 PROCEDURE — 97110 THERAPEUTIC EXERCISES: CPT

## 2020-08-13 PROCEDURE — 97014 ELECTRIC STIMULATION THERAPY: CPT

## 2020-08-13 NOTE — OP THERAPY DAILY TREATMENT
Outpatient Physical Therapy  DAILY TREATMENT     [facility]  [address]  [place] [address]  Phone:  [phone]  Fax:  [phone]    Date: 08/13/2020    Patient: Emeli Justin  YOB: 1980  MRN: [id number]     Time Calculation    Start time: 0829  Stop time: 0915 Time Calculation (min): 46 minutes         Chief Complaint: No chief complaint on file.    Visit #: 10    SUBJECTIVE:  I am now having pain on my left side. I think I am doing better     I have an appt with [facility] on Tuesday.  Explanation regarding physiatry versus orthopedics versus neurologic.       OBJECTIVE:  Current objective measures:           Therapeutic Exercises (CPT 20628):     1. Nustep L3 6 minutes    2. Core exercises with cuff: leg extensions, fall outs    3. Core exercises with cuff:90/90 with toe taps     4. Quadriped with leg extension     Therapeutic Treatments and Modalities:     1. E Stim Unattended (CPT 56002), IFC and MHP to lumbar  x 15 min    Time-based treatments/modalities:    Physical Therapy Timed Treatment Charges  Therapeutic exercise minutes (CPT 74104): 30 minutes      ASSESSMENT:   Response to treatment: Patient tolerated treatment well. Discussion regarding avoiding excessive extension.     PLAN/RECOMMENDATIONS:   Plan for treatment: therapy treatment to continue next visit.  Planned interventions for next visit: continue with current treatment.

## 2020-08-14 ENCOUNTER — OCCUPATIONAL MEDICINE (OUTPATIENT)
Dept: OCCUPATIONAL MEDICINE | Facility: CLINIC | Age: 40
End: 2020-08-14
Payer: COMMERCIAL

## 2020-08-14 VITALS
WEIGHT: 134 LBS | RESPIRATION RATE: 12 BRPM | HEIGHT: 63 IN | TEMPERATURE: 97.8 F | DIASTOLIC BLOOD PRESSURE: 78 MMHG | OXYGEN SATURATION: 96 % | BODY MASS INDEX: 23.74 KG/M2 | SYSTOLIC BLOOD PRESSURE: 122 MMHG | HEART RATE: 89 BPM

## 2020-08-14 DIAGNOSIS — M54.17 LUMBOSACRAL RADICULOPATHY: ICD-10-CM

## 2020-08-14 DIAGNOSIS — S39.012D STRAIN OF LUMBAR REGION, SUBSEQUENT ENCOUNTER: ICD-10-CM

## 2020-08-14 PROCEDURE — 99213 OFFICE O/P EST LOW 20 MIN: CPT | Performed by: NURSE PRACTITIONER

## 2020-08-14 ASSESSMENT — ENCOUNTER SYMPTOMS
NEUROLOGICAL NEGATIVE: 1
BACK PAIN: 1
CARDIOVASCULAR NEGATIVE: 1
RESPIRATORY NEGATIVE: 1
PSYCHIATRIC NEGATIVE: 1
MYALGIAS: 1
CONSTITUTIONAL NEGATIVE: 1

## 2020-08-14 ASSESSMENT — PAIN SCALES - GENERAL: PAINLEVEL: NO PAIN

## 2020-08-14 ASSESSMENT — FIBROSIS 4 INDEX: FIB4 SCORE: 0.6

## 2020-08-14 NOTE — PROGRESS NOTES
"Subjective:      Emeli Justin is a 39 y.o. female who presents with Other (#18)          DOI: 5/23/2020 - The patient works as an RN at [employer].  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         Today overall patient states she has had some improvement.  Pain is less frequent, occasionally triggered with certain movements, and isolated to the mid to lower back.  Patient denies leg weakness, saddle anesthesia, or bowel or bladder changes.  Patient states that physical therapy has been very helpful.  Patient states she has not had to take any medication for her symptoms at this time.  Patient states she is tolerating work restrictions without difficulty.  Patient has a follow-up appointment scheduled with Kristine huynh next Tuesday.  MRI results reviewed with patient.  Plan of care discussed with patient.    HPI    Review of Systems   Constitutional: Negative.    Respiratory: Negative.    Cardiovascular: Negative.    Musculoskeletal: Positive for back pain and myalgias.   Skin: Negative.    Neurological: Negative.    Psychiatric/Behavioral: Negative.         SOCHX: Works as a RN at [employer]  FH: No pertinent family history to this problem.   Objective:     /78   Pulse 89   Temp 36.6 °C (97.8 °F)   Resp 12   Ht 1.6 m (5' 3\")   Wt 60.8 kg (134 lb)   SpO2 96%   BMI 23.74 kg/m²      Physical Exam  Constitutional:       General: She is not in acute distress.     Appearance: Normal appearance. She is not ill-appearing.   Cardiovascular:      Rate and Rhythm: Normal rate and regular rhythm.      Pulses: Normal pulses.   Pulmonary:      Effort: Pulmonary effort is normal.   Musculoskeletal: Normal range of motion.         General: Tenderness and signs of injury present. No swelling or deformity.   Skin:     General: Skin is warm and dry.      Capillary Refill: Capillary refill takes less than 2 seconds.      Findings: No bruising or " erythema.   Neurological:      General: No focal deficit present.      Mental Status: She is alert and oriented to person, place, and time.      Cranial Nerves: No cranial nerve deficit.      Sensory: No sensory deficit.      Motor: No weakness.      Gait: Gait normal.      Deep Tendon Reflexes: Reflexes normal.   Psychiatric:         Mood and Affect: Mood normal.         Behavior: Behavior normal.         Lumbar: No gross deformity noted. Mild tenderness to paraspinal musculature L3-S1 and right SI joint.  Neg decreased flexion or rotation.  Straight leg test neg left, positive on the right.  Able to heel/toe walk with minimal difficulty. Cranial nerves grossly intact.  Achilles and patellar reflexes 2+ bilaterally.   Sensation intact. No gait abnormalities      MRI result 7/28/2020:  IMPRESSION:     1.  L4-L5 moderate posterior disc protrusion/extrusion. Mild spinal stenosis. Lateral recess stenosis and moderate foraminal narrowing.  2.  L5 pars defects. Hypertrophied left L5 transverse process with partial sacralization.       Assessment/Plan:         1. Lumbosacral radiculopathy     2. Strain of lumbar region, subsequent encounter         Follow-up in 3 weeks, if not seen by neurosurgery   Restricted duty, refer to neurosurgery   Keep neurosurgery appointments 8/18/2020   Physical therapy sessions as scheduled   Continue with gentle range of motion and stretching exercises as tolerated   Continue with OTC Tylenol/ibuprofen as needed for symptoms   Try OTC topical ointments/creams as needed   Continue rest, ice, and heat as needed     There are no diagnoses linked to this encounter.

## 2020-08-14 NOTE — LETTER
[address],   [address] - [place] [address]  Phone:  [phone] - Fax:  [phone]   Occupational Health [facility] Progress Report and Disability Certification  Date of Service: 8/14/2020   No Show:  No  Date / Time of Next Visit:  Discharged/care transfer to neurosurgery   Claim Information   Patient Name: Emeli Justin  Claim Number:     Employer: [employer]  Date of Injury:      Insurer / TPA: Workers [employer]  ID / SSN:     Occupation:  RN Diagnosis: There were no encounter diagnoses.    Medical Information   Related to Industrial Injury? Yes    Subjective Complaints:      DOI: 5/23/2020 - The patient works as an RN at [employer].  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         Today overall patient states she has had some improvement.  Pain is less frequent, occasionally triggered with certain movements, and isolated to the mid to lower back.  Patient denies leg weakness, saddle anesthesia, or bowel or bladder changes.  Patient states that physical therapy has been very helpful.  Patient states she has not had to take any medication for her symptoms at this time.  Patient states she is tolerating work restrictions without difficulty.  Patient has a follow-up appointment scheduled with Kristine huynh next Tuesday.  MRI results reviewed with patient.  Plan of care discussed with patient.   Objective Findings: Lumbar: No gross deformity noted. Mild tenderness to paraspinal musculature L3-S1 and right SI joint.  Neg decreased flexion or rotation.  Straight leg test neg left, positive on the right.  Able to heel/toe walk with minimal difficulty. Cranial nerves grossly intact.  Achilles and patellar reflexes 2+ bilaterally.   Sensation intact. No gait abnormalities      MRI result 7/28/2020:  IMPRESSION:     1.  L4-L5 moderate posterior disc protrusion/extrusion. Mild spinal  stenosis. Lateral recess stenosis and moderate foraminal narrowing.  2.  L5 pars defects. Hypertrophied left L5 transverse process with partial sacralization.   Pre-Existing Condition(s):     Assessment:   Condition Improved    Status: Discharged / Care Transfer  Permanent Disability:No    Plan: Transfer CarePT    Diagnostics:      Comments:  Follow-up in 3 weeks, if not seen by neurosurgery  Restricted duty, refer to neurosurgery  Keep neurosurgery appointments 8/18/2020  Physical therapy sessions as scheduled  Continue with gentle range of motion and stretching exercises as tolerated     Continue with OTC Tylenol/ibuprofen as needed for symptoms  Try OTC topical ointments/creams as needed   Continue rest, ice, and heat as needed     Disability Information   Status: Released to Restricted Duty    From:  8/14/2020  Through:   Restrictions are: Temporary   Physical Restrictions   Sitting:    Standing:    Stooping:  < or = to 2 hrs/day Bending:  < or = to 2 hrs/day   Squatting:    Walking:    Climbing:    Pushing:      Pulling:    Other:    Reaching Above Shoulder (L):   Reaching Above Shoulder (R):       Reaching Below Shoulder (L):    Reaching Below Shoulder (R):      Not to exceed Weight Limits   Carrying(hrs):   Weight Limit(lb): < or = to 10 pounds Lifting(hrs):   Weight  Limit(lb): < or = to 10 pounds   Comments: Sedentary work with ability to sit, stand, and walk as needed for symptoms       Repetitive Actions   Hands: i.e. Fine Manipulations from Grasping:     Feet: i.e. Operating Foot Controls:     Driving / Operate Machinery:     Provider Name:   SHOAIB [place] Physician Signature:  Physician Name:     Clinic Name / Location: [address],   [address] [address] Clinic Phone Number: Dept: [phone]   Appointment Time: 8:15 Am Visit Start Time: 8:09 AM   Check-In Time:  8:01 Am Visit Discharge Time:  8:44 AM    Original-Treating Physician or  Chiropractor    Page 2-Insurer/TPA    Page 3-Employer    Page 4-Employee

## 2020-09-30 ENCOUNTER — PHYSICAL THERAPY (OUTPATIENT)
Dept: PHYSICAL THERAPY | Facility: MEDICAL CENTER | Age: 40
End: 2020-09-30
Attending: NURSE PRACTITIONER
Payer: COMMERCIAL

## 2020-09-30 DIAGNOSIS — M54.16 RADICULOPATHY, LUMBAR REGION: ICD-10-CM

## 2020-09-30 PROCEDURE — 97161 PT EVAL LOW COMPLEX 20 MIN: CPT

## 2020-09-30 PROCEDURE — 97014 ELECTRIC STIMULATION THERAPY: CPT

## 2020-09-30 ASSESSMENT — ENCOUNTER SYMPTOMS
ALLEVIATING FACTORS: STANDING
ALLEVIATING FACTORS: EXERCISE
QUALITY: ACHING
EXACERBATED BY: PROLONGED SITTING
ALLEVIATING FACTORS: WALKING
PAIN SCALE: 2

## 2020-09-30 NOTE — OP THERAPY EVALUATION
Outpatient Physical Therapy  INITIAL EVALUATION    [facility] Outpatient Physical Therapy  [address]  [place] [address]  Phone:  [phone]  Fax:  [phone]    Date of Evaluation: 09/30/2020    Patient: Emeli Justin  YOB: 1980  MRN: [id number]     Referring Provider: Joaquín Rock M.D.  [address]  [address]  [place] [address]   Referring Diagnosis Strain of muscle, fascia and tendon of lower back, subsequent encounter [S39.012D]     Time Calculation    Start time: 1410  Stop time: 1505 Time Calculation (min): 55 minutes         Chief Complaint: No chief complaint on file.    Visit Diagnoses     ICD-10-CM   1. 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 She felt a pain in her back and then reports her pain continued.  Patient reports she was also having pain down her right leg.  She reports she continued to work without restriction initially.  She reports she is currently not working at all at this time.  She reports she is not having pain down her right leg, but is having warmth down her right leg.  She reports the ache is down the back of her leg and occasionally has symptoms down the front of her leg.  She reports she has symptoms that occasionally goes down to the bottom of her right foot.     Patient had an MRI 1.  L4-L5 moderate posterior disc protrusion/extrusion. Mild spinal stenosis. Lateral recess stenosis and moderate foraminal narrowing.  2.  L5 pars defects. Hypertrophied left L5 transverse process with partial sacralization    Patient saw Dr. Rock of [facility]. I was released back to full duty as of 9/24/20.  I worked full duty last night and I felt some aching, but it was much better than light duty.      She reports she has a  follow up appointment on 2020.  Patient reports she is doing personal training at the gym and is doing well overall.     She reports she is continuing to do the exercises previously given to her.        Sleep disturbance:  Not disrupted  Pain:     Current pain ratin    Quality:  Aching    Pain timing: at the end of a shift     Relieving factors:  Standing, walking and exercise    Aggravating factors:  Prolonged sitting    Progression:  Improving  Social Support:     Lives in:  One-story house  Hand dominance:  Right  Diagnostic Tests:     Diagnostic Tests Comments:  1.  L4-L5 moderate posterior disc protrusion/extrusion. Mild spinal stenosis. Lateral recess stenosis and moderate foraminal narrowing.  2.  L5 pars defects. Hypertrophied left L5 transverse process with partial sacralization.  Treatments:     Previous treatment:  Physical therapy  Activities of Daily Living:     Patient reported ADL status: Works as a nurse at [employer]. Is active and enjoys going to the gym.   Patient Goals:     Patient goals for therapy:  Decreased pain and increased strength      Past Medical History:   Diagnosis Date   • Mitral valve prolapse 2015   • Pre-eclampsia      No past surgical history on file.  Social History     Tobacco Use   • Smoking status: Never Smoker   • Smokeless tobacco: Never Used   Substance Use Topics   • Alcohol use: Never     Frequency: Never     Family and Occupational History     Socioeconomic History   • Marital status:      Spouse name: Not on file   • Number of children: Not on file   • Years of education: Not on file   • Highest education level: Not on file   Occupational History   • Not on file       Objective     Active Range of Motion     Lumbar   Flexion: decreased  Extension: within functional limits  Left lateral flexion: within functional limits  Right lateral flexion: within functional limits  Left rotation: within functional limits  Right rotation: within functional  limits    Strength:      Abdominals   Lower abdominals: Able to maintain neutral statically    Lower extremities   Normal left lower extremity strength  Normal right lower extremity strength        Therapeutic Exercises (CPT 98288):     1. Forward planks , added to HEP - no holds longer than 30 sec     8. UPOC due 11/25/20    Therapeutic Treatments and Modalities:     1. E Stim Unattended (CPT 04394), IFC and heat x 1 5min to lumbar spine in prone    Time-based treatments/modalities:           Assessment, Response and Plan:   Impairments: pain with function    Assessment details:  Patient is a 40 year old female previously known to this therapist who returns to PT after seeing the PM&R MD at [facility]. Patient is doing well overall and presents with spondyolisthesis and disk extrusion.  Patient has recently returned to full duty and continues to present with pain after a full shift at work and fear avoidance behaviors for specific activities. Patient would benefit from skilled PT with a focus on the deficits above to decrease pain and to improve patients quality of life and work related activities.   Barriers to therapy:  None  Prognosis: good    Goals:   Short Term Goals:   1. Patient will report being able to work full shifts without reporting greater than  a 2/10 pain at the end.   Short term goal time span:  2-4 weeks      Long Term Goals:    1. Patient will report being able to squat with up to 20# without reporting any leg or back pain.    2.  Patient will be able to shift patients in bed with proper body mechanics noted and no fear associated with activity.   3. Patient will be able to return to the gym and work out with modifications without being limited by back pain.    4. Patient will return to full time, full duty work without being limited by back pain.   Long term goal time span:  6-8 weeks    Plan:   Therapy options:  Physical therapy treatment to continue  Planned therapy interventions:   Neuromuscular Re-education (CPT 78406), Manual Therapy (CPT 53352), Therapeutic Exercise (CPT 25743), Therapeutic Activities (CPT 78958) and E Stim Unattended (CPT 40407)  Frequency:  2x week  Duration in weeks:  6  Discussed with:  Patient  Plan details:  1-2 times per week      Functional Assessment Used  Vern Edward Low Back Pain and Disability Score: 16.67     Referring provider co-signature:  I have reviewed this plan of care and my co-signature certifies the need for services.    Certification Period: 09/30/2020 to  11/25/20    Physician Signature: ________________________________ Date: ______________

## 2020-10-02 ENCOUNTER — OFFICE VISIT (OUTPATIENT)
Dept: MEDICAL GROUP | Facility: MEDICAL CENTER | Age: 40
End: 2020-10-02
Payer: COMMERCIAL

## 2020-10-02 VITALS
HEART RATE: 82 BPM | TEMPERATURE: 97.9 F | BODY MASS INDEX: 23.57 KG/M2 | HEIGHT: 63 IN | WEIGHT: 133 LBS | SYSTOLIC BLOOD PRESSURE: 118 MMHG | DIASTOLIC BLOOD PRESSURE: 86 MMHG | OXYGEN SATURATION: 97 % | RESPIRATION RATE: 16 BRPM

## 2020-10-02 DIAGNOSIS — Z23 NEED FOR VACCINATION: ICD-10-CM

## 2020-10-02 DIAGNOSIS — M54.50 ACUTE BILATERAL LOW BACK PAIN WITHOUT SCIATICA: ICD-10-CM

## 2020-10-02 DIAGNOSIS — Z00.00 PREVENTATIVE HEALTH CARE: ICD-10-CM

## 2020-10-02 DIAGNOSIS — R03.0 ELEVATED BP WITHOUT DIAGNOSIS OF HYPERTENSION: ICD-10-CM

## 2020-10-02 PROBLEM — R07.2 PRECORDIAL PAIN: Status: RESOLVED | Noted: 2020-02-07 | Resolved: 2020-10-02

## 2020-10-02 PROBLEM — M25.561 ACUTE PAIN OF BOTH KNEES: Status: RESOLVED | Noted: 2020-01-31 | Resolved: 2020-10-02

## 2020-10-02 PROBLEM — M25.562 ACUTE PAIN OF BOTH KNEES: Status: RESOLVED | Noted: 2020-01-31 | Resolved: 2020-10-02

## 2020-10-02 PROBLEM — I31.9 PERICARDITIS: Status: RESOLVED | Noted: 2020-01-09 | Resolved: 2020-10-02

## 2020-10-02 PROCEDURE — 90686 IIV4 VACC NO PRSV 0.5 ML IM: CPT | Performed by: PHYSICIAN ASSISTANT

## 2020-10-02 PROCEDURE — 90471 IMMUNIZATION ADMIN: CPT | Performed by: PHYSICIAN ASSISTANT

## 2020-10-02 PROCEDURE — 99214 OFFICE O/P EST MOD 30 MIN: CPT | Mod: 25 | Performed by: PHYSICIAN ASSISTANT

## 2020-10-02 ASSESSMENT — FIBROSIS 4 INDEX: FIB4 SCORE: 0.62

## 2020-10-02 NOTE — ASSESSMENT & PLAN NOTE
This is a 40-year-old female with a history of pericarditis.  She states over the weekend she was at a party with her family.  Eating lots of food.  She started to have a headache and she had a blood pressure checked because she was concerned about hypertension.  Blood pressure checked by her son with her home cuff was in the 140s over 90s.  Typically it is been well controlled.  She is here today to follow-up with a blood pressure check.

## 2020-10-02 NOTE — ASSESSMENT & PLAN NOTE
Has a pain of her lower back which is worse when sitting.  Currently going through [employer].  Has an appointment with a spine doctor in the near future.  Took diclofenac and Flexeril.  Stop the medications on the day of a party.  Has been taking diclofenac for 2 weeks.  Is not planning to restart it.

## 2020-10-02 NOTE — PROGRESS NOTES
"Subjective:   Emeli Justin is a 40 y.o. female here today for elevated PT and low back pain.  Also preventative health care.    Elevated BP without diagnosis of hypertension  This is a 40-year-old female with a history of pericarditis.  She states over the weekend she was at a party with her family.  Eating lots of food.  She started to have a headache and she had a blood pressure checked because she was concerned about hypertension.  Blood pressure checked by her son with her home cuff was in the 140s over 90s.  Typically it is been well controlled.  She is here today to follow-up with a blood pressure check.    Acute bilateral low back pain without sciatica  Has a pain of her lower back which is worse when sitting.  Currently going through [employer]  Has an appointment with a spine doctor in the near future.  Took diclofenac and Flexeril.  Stop the medications on the day of a party.  Has been taking diclofenac for 2 weeks.  Is not planning to restart it.       Current medicines (including changes today)  Current Outpatient Medications   Medication Sig Dispense Refill   • Ascorbic Acid (VITAMIN C PO) Take 1 Tab by mouth every morning.     • Cyanocobalamin (VITAMIN B12 PO) Take 1 Tab by mouth every morning.     • Coenzyme Q10 (COQ10 PO) Take 2 Tabs by mouth every morning.     • therapeutic multivitamin-minerals (THERAGRAN-M) Tab Take 1 Tab by mouth every day.       No current facility-administered medications for this visit.      She  has a past medical history of Mitral valve prolapse (2015) and Pre-eclampsia (2014).    Social History and Family History were reviewed and updated.    ROS   No chest pain, no shortness of breath, no abdominal pain and all other systems were reviewed and are negative.       Objective:     /86   Pulse 82   Temp 36.6 °C (97.9 °F) (Temporal)   Resp 16   Ht 1.6 m (5' 3\")   Wt 60.3 kg (133 lb)   SpO2 97%  Body mass index is 23.56 kg/m².   Physical " Exam:  Constitutional: Alert, no distress.  Skin: Warm, dry, good turgor, no rashes in visible areas.  Eye: Equal, round and reactive, conjunctiva clear, lids normal.  ENMT: Lips without lesions, good dentition, oropharynx clear.  Neck: Trachea midline, no masses.   Lymph: No cervical or supraclavicular lymphadenopathy  Respiratory: Unlabored respiratory effort, lungs clear to auscultation, no wheezes, no ronchi.  Cardiovascular: Normal S1, S2, no murmur, no edema.  Psych: Alert and oriented x3, normal affect and mood.        Assessment and Plan:   The following treatment plan was discussed    1. Elevated BP without diagnosis of hypertension  Acute condition.  Resolved.  Advised blood pressure is good today.  Blood pressure may have been elevated to salty food as well as Voltaren.  Stop Voltaren.  Continue to eat healthy and exercise routinely.  Advised follow-up in 6 months to recheck BP.    2. Acute bilateral low back pain without sciatica  Acute, new onset condition.  Workmen's Comp. case.  Follow at spine care.  Advised to stop Voltaren.    3. Need for vaccination  Administered without complaints.  - Influenza Vaccine Quad Injection (PF)    4. Preventative health care  Order labs fasting.  She will be contacted with the results.  - Lipid Profile; Future  - Comp Metabolic Panel; Future  - HEMOGLOBIN A1C; Future  - VITAMIN D,25 HYDROXY; Future      Followup: Return in about 6 months (around 4/2/2021), or if symptoms worsen or fail to improve.    Please note that this dictation was created using voice recognition software. I have made every reasonable attempt to correct obvious errors, but I expect that there are errors of grammar and possibly content that I did not discover before finalizing the note.

## 2020-10-05 ENCOUNTER — PHYSICAL THERAPY (OUTPATIENT)
Dept: PHYSICAL THERAPY | Facility: MEDICAL CENTER | Age: 40
End: 2020-10-05
Attending: ORTHOPAEDIC SURGERY
Payer: COMMERCIAL

## 2020-10-05 DIAGNOSIS — M54.16 LUMBAR RADICULOPATHY: ICD-10-CM

## 2020-10-05 PROCEDURE — 97110 THERAPEUTIC EXERCISES: CPT

## 2020-10-05 PROCEDURE — 97530 THERAPEUTIC ACTIVITIES: CPT

## 2020-10-05 NOTE — OP THERAPY DAILY TREATMENT
Outpatient Physical Therapy  DAILY TREATMENT     [facility] Outpatient Physical Therapy  [address]  [place] [address]  Phone:  [phone]  Fax:  [phone]    Date: 10/05/2020    Patient: Emeli Justin  YOB: 1980  MRN: [id number]     Time Calculation    Start time: 1530  Stop time: 1600 Time Calculation (min): 30 minutes         Chief Complaint: Back Injury    Visit #: 2    SUBJECTIVE:  The patient reports she is doing really well, she has been working with the  and tonight is her 4th night back at work at full duty (vs. Light duty). She reports she hasn't had any issues but she is still very nervous about lifting the right way to protect her back.     OBJECTIVE:  Current objective measures:   WFL ROM and strength, fair to good lifting mechanics.           Therapeutic Exercises (CPT 29185):     1. Forward planks , added to HEP - no holds longer than 30 sec     2. NuStep, 5 mins    3. Lifting mechanics review, weight from the floor, weight from waist height, 9-25 lbs, 15 mins total    4. Ball wall squats    5. Air squats    6. Gait belt training    7. Patient transfers    8. UPOC due 11/25/20      Physical Therapy Timed Treatment Charges  Therapeutic activity minutes (CPT 90574): 15 minutes  Therapeutic exercise minutes (CPT 18288): 15 minutes      Pain rating (1-10) before treatment:  0  Pain rating (1-10) after treatment:  0    ASSESSMENT:   Response to treatment: the patient responded to treatment very well, able to demonstrate proper use of gait belt, lifting mechanics at various heights/weights, transfers with a variety of assistance, and reports feeling overall significantly more confident with patient care and also protecting her back.     PLAN/RECOMMENDATIONS:   Plan for treatment: therapy treatment to continue next visit.  Planned interventions for next visit: continue with current treatment.

## 2020-10-07 ENCOUNTER — PHYSICAL THERAPY (OUTPATIENT)
Dept: PHYSICAL THERAPY | Facility: MEDICAL CENTER | Age: 40
End: 2020-10-07
Attending: ORTHOPAEDIC SURGERY
Payer: COMMERCIAL

## 2020-10-07 DIAGNOSIS — M54.16 LUMBAR RADICULOPATHY: ICD-10-CM

## 2020-10-07 PROCEDURE — 97110 THERAPEUTIC EXERCISES: CPT

## 2020-10-07 NOTE — OP THERAPY DAILY TREATMENT
Outpatient Physical Therapy  DAILY TREATMENT     [facility] Outpatient Physical Therapy  [address]  [place] [address]  Phone:  [phone]  Fax:  [phone]    Date: 10/07/2020    Patient: Emeli Justin  YOB: 1980  MRN: [id number]     Time Calculation    Start time: 0956  Stop time: 1040 Time Calculation (min): 44 minutes         Chief Complaint: Work-Related Injury and Back Injury    Visit #: 3    SUBJECTIVE:  The patient returns for daily treatment and reports she has been feeling really good. She reports she seems to do better with moving more at work vs when she was seated a lot more. 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     OBJECTIVE:  Current objective measures:   Good body mechanics review; patient able to demonstrate different lifts/transfers appropriately. Good core strength, good posture.           Therapeutic Exercises (CPT 35951):     1. Forward planks , added to HEP - no holds longer than 30 sec     2. Treadmill, 6 mins    3. Lifting mechanics review, weight from the floor, weight from waist height, 9-25 lbs, 10 mins total    4. Wall sits, 4x 30 seconds    5. Air squats, x20    6. Lat pull down, 2x10; 25lbs    7. Weight row , 5lb each side, 2x10    8. Seated marches on ball, x20    9. Seated dead bugs on ball, x20    13. UPOC due 11/25/20    Therapeutic Treatments and Modalities:     1. Hot or Cold Pack Therapy (CPT 95877), hot pack, lumbar    Time-based treatments/modalities:    Physical Therapy Timed Treatment Charges  Therapeutic exercise minutes (CPT 91719): 30 minutes    ASSESSMENT:   Response to treatment: patient tolerated treatment very well; able to do all exercises with some fatigue but no pain. Overall, progressing very well.     PLAN/RECOMMENDATIONS:   Plan for treatment: therapy treatment to continue next visit.  Planned interventions for next  visit:   and continue with current treatment.

## 2020-10-08 ENCOUNTER — APPOINTMENT (OUTPATIENT)
Dept: PHYSICAL THERAPY | Facility: MEDICAL CENTER | Age: 40
End: 2020-10-08
Payer: COMMERCIAL

## 2020-10-14 ENCOUNTER — PHYSICAL THERAPY (OUTPATIENT)
Dept: PHYSICAL THERAPY | Facility: MEDICAL CENTER | Age: 40
End: 2020-10-14
Attending: ORTHOPAEDIC SURGERY
Payer: COMMERCIAL

## 2020-10-14 DIAGNOSIS — M54.16 LUMBAR RADICULOPATHY: ICD-10-CM

## 2020-10-14 PROCEDURE — 97110 THERAPEUTIC EXERCISES: CPT

## 2020-10-14 NOTE — OP THERAPY DAILY TREATMENT
Outpatient Physical Therapy  DAILY TREATMENT     [facility] Outpatient Physical Therapy  [address]  [place] [address]  Phone:  [phone]  Fax:  [phone]    Date: 10/14/2020    Patient: Emeli Justin  YOB: 1980  MRN: [id number]     Time Calculation    Start time: 1430  Stop time: 1500 Time Calculation (min): 30 minutes         Chief Complaint: No chief complaint on file.    Visit #: 4    SUBJECTIVE:  I am doing okay. I am not having pain, but I am just aware of it.      OBJECTIVE:    Therapeutic Exercises (CPT 63767):     1. Forward planks , added to HEP - no holds longer than 30 sec     2. Treadmill, 6 mins    3. Lifting mechanics review, weight from the floor, weight from waist height, 9-25 lbs, 10 mins total    4. Wall sits, 4x 30 seconds    5. Air squats, x20    6. Lat pull down, 2x10; 25lbs    7. Single leg RDL , 10 x 2     8. Seated marches on ball, x20    9. Prone over swiss ball with leg and arm extension , x20    10. Squats with 25# dumbbell , 2 x 10     11. Thrusters with overhead weights , 3 x 8 5# weights in each hand     12. Ambulation with 5# weights on shoulders , cues for stability and TA activation     13. UPOC due 11/25/20    Therapeutic Treatments and Modalities:     1. Hot or Cold Pack Therapy (CPT 00458), hot pack, lumbar    Time-based treatments/modalities:    Physical Therapy Timed Treatment Charges  Therapeutic exercise minutes (CPT 54107): 25 minutes    ASSESSMENT:   Response to treatment:   Patient is doing well and demonstrating improved confidence with lifting activities.   PLAN/RECOMMENDATIONS:   Plan for treatment: therapy treatment to continue next visit.  Continue with 1 more PT visit and then plan to d/c   Planned interventions for next visit: continue with current treatment.

## 2020-12-17 ENCOUNTER — TELEPHONE (OUTPATIENT)
Dept: PHYSICAL THERAPY | Facility: MEDICAL CENTER | Age: 40
End: 2020-12-17

## 2020-12-17 NOTE — OP THERAPY DISCHARGE SUMMARY
Outpatient Physical Therapy  DISCHARGE SUMMARY NOTE      [facility] Outpatient Physical Therapy  [address]  [place] [address]  Phone:  [phone]  Fax:  [phone]    Date of Visit: 12/17/2020    Patient: Emeli Justin  YOB: 1980  MRN: [id number]     Referring Provider: No referring provider defined for this encounter.   Referring Diagnosis No admission diagnoses are documented for this encounter.         Functional Assessment Used        Your patient is being discharged from Physical Therapy with the following comments:   · Goals met      Recommendations:  Patient has returned to full duty at work. D/C from PT at this time.     Karine Davidson, PT, DPT    Date: 12/17/2020

## 2021-04-05 ENCOUNTER — APPOINTMENT (OUTPATIENT)
Dept: MEDICAL GROUP | Facility: MEDICAL CENTER | Age: 41
End: 2021-04-05
Payer: COMMERCIAL

## 2021-08-20 ENCOUNTER — OFFICE VISIT (OUTPATIENT)
Dept: URGENT CARE | Facility: CLINIC | Age: 41
End: 2021-08-20
Payer: COMMERCIAL

## 2021-08-20 VITALS
DIASTOLIC BLOOD PRESSURE: 88 MMHG | SYSTOLIC BLOOD PRESSURE: 120 MMHG | RESPIRATION RATE: 16 BRPM | BODY MASS INDEX: 23.57 KG/M2 | WEIGHT: 133 LBS | HEART RATE: 88 BPM | OXYGEN SATURATION: 98 % | TEMPERATURE: 97.8 F | HEIGHT: 63 IN

## 2021-08-20 DIAGNOSIS — L50.9 URTICARIA: ICD-10-CM

## 2021-08-20 PROCEDURE — 99214 OFFICE O/P EST MOD 30 MIN: CPT | Performed by: PHYSICIAN ASSISTANT

## 2021-08-20 RX ORDER — PREDNISONE 10 MG/1
TABLET ORAL
Qty: 14 TABLET | Refills: 0 | Status: SHIPPED | OUTPATIENT
Start: 2021-08-20

## 2021-08-20 RX ORDER — METHYLPREDNISOLONE SODIUM SUCCINATE 125 MG/2ML
125 INJECTION, POWDER, LYOPHILIZED, FOR SOLUTION INTRAMUSCULAR; INTRAVENOUS ONCE
Status: COMPLETED | OUTPATIENT
Start: 2021-08-20 | End: 2021-08-20

## 2021-08-20 RX ADMIN — METHYLPREDNISOLONE SODIUM SUCCINATE 125 MG: 125 INJECTION, POWDER, LYOPHILIZED, FOR SOLUTION INTRAMUSCULAR; INTRAVENOUS at 09:57

## 2021-08-20 ASSESSMENT — FIBROSIS 4 INDEX: FIB4 SCORE: 0.62

## 2021-08-20 NOTE — PROGRESS NOTES
"Subjective:   Emeli Justin is a 40 y.o. female who presents for Rash (x 4 days, upper body, significant itching/redness, raised bumps/urticaria)      HPI  Patient presents the clinic with complaints of rash/hives throughout her entire body onset approximately 4 days ago.  She states she ate some salty dried fish which she has not had in a long time.  She used to eat this a lot in the past though.  Denies any allergies.  She reports possible fish oil allergy, however she states the rash at that time most likely was viral rather than from the fish oil.  She took Benadryl and cortisone cream and her symptoms have itching and rash almost resolved.  She ate a McMuffin this morning and then the rash returned.  Significant itching.  Primarily to the arms.  No new skin contacts.  No swelling of the lips, tongue, throat.  She denies any fever, chills, dizziness, shortness of breath.  She has no other complaints or concerns.          Medications:    • COQ10 PO  • therapeutic multivitamin-minerals Tabs  • VITAMIN B12 PO  • VITAMIN C PO    Allergies: Fish oil    Problem List: Emeli Justin does not have any pertinent problems on file.    Surgical History:  No past surgical history on file.    Past Social Hx: Emeli Justin  reports that she has never smoked. She has never used smokeless tobacco. She reports that she does not drink alcohol and does not use drugs.     Past Family Hx:  Emeli Justin family history includes Diabetes in her father and mother; Hypertension in her father and mother.     Problem list, medications, and allergies reviewed by myself today in Epic.     Objective:     /88 (BP Location: Right arm, Patient Position: Sitting, BP Cuff Size: Adult)   Pulse 88   Temp 36.6 °C (97.8 °F) (Temporal)   Resp 16   Ht 1.6 m (5' 3\")   Wt 60.3 kg (133 lb)   SpO2 98%   BMI 23.56 kg/m²     Physical Exam  Vitals reviewed.   Constitutional:       General: She is not in acute distress.     Appearance: " Normal appearance. She is not ill-appearing or toxic-appearing.   HENT:      Mouth/Throat:      Mouth: Mucous membranes are moist. No angioedema.      Pharynx: Oropharynx is clear. No oropharyngeal exudate or posterior oropharyngeal erythema.   Eyes:      Conjunctiva/sclera: Conjunctivae normal.      Pupils: Pupils are equal, round, and reactive to light.   Cardiovascular:      Rate and Rhythm: Normal rate and regular rhythm.      Heart sounds: Normal heart sounds.   Pulmonary:      Effort: Pulmonary effort is normal. No respiratory distress.      Breath sounds: Normal breath sounds. No wheezing, rhonchi or rales.   Musculoskeletal:      Cervical back: Neck supple.   Lymphadenopathy:      Cervical: No cervical adenopathy.   Skin:     General: Skin is dry.      Comments: Scattered focal areas of urticaria diffusely throughout bilateral arms, torso, neck.   Neurological:      General: No focal deficit present.      Mental Status: She is alert and oriented to person, place, and time.   Psychiatric:         Mood and Affect: Mood normal.         Behavior: Behavior normal.         Diagnosis and associated orders:     1. Urticaria  methylPREDNISolone sod succ (SOLU-MEDROL) 125 MG injection 125 mg    predniSONE (DELTASONE) 10 MG Tab      Comments/MDM:     • Solu-Medrol 125 mg IM x1 here in the clinic.  Patient monitored for 5 to 10 minutes.  No adverse reactions.  • Prednisone taper therapy  • Benadryl 50 mg p.o. around-the-clock every 6 hours for at least the next 24 hours.   • Include a log of foods and skin contacts.  Wash clothes and bedding.  • Closely monitor symptoms.  Patient overall is very well-appearing.  Normal blood pressure.  No signs of angioedema.        I personally reviewed prior external notes and test results pertinent to today's visit. Red flags discussed and indications to present to the Emergency Department. Supportive care, natural history, differential diagnoses, and indications for immediate  follow-up discussed. Patient expresses understanding and agrees to plan. Patient denies any other questions or concerns.     Advised the patient to follow-up with the primary care physician for recheck, reevaluation, and consideration of further management.    Time spent evaluating the patient was 30 minutes which included preparing for the visit, obtaining history, examination, ordering labs/tests/procedures/medications, independent interpretation, discussion of plan, counseling/education, medical information reconciliation, and documentation into chart.     Please note that this dictation was created using voice recognition software. I have made a reasonable attempt to correct obvious errors, but I expect that there are errors of grammar and possibly content that I did not discover before finalizing the note.    This note was electronically signed by Saulo Martini PA-C

## 2021-08-27 ENCOUNTER — OFFICE VISIT (OUTPATIENT)
Dept: URGENT CARE | Facility: CLINIC | Age: 41
End: 2021-08-27
Payer: COMMERCIAL

## 2021-08-27 VITALS
DIASTOLIC BLOOD PRESSURE: 82 MMHG | TEMPERATURE: 97.1 F | HEIGHT: 63 IN | SYSTOLIC BLOOD PRESSURE: 114 MMHG | RESPIRATION RATE: 14 BRPM | WEIGHT: 139 LBS | BODY MASS INDEX: 24.63 KG/M2 | OXYGEN SATURATION: 97 % | HEART RATE: 74 BPM

## 2021-08-27 DIAGNOSIS — R42 DIZZINESS: ICD-10-CM

## 2021-08-27 PROCEDURE — 99214 OFFICE O/P EST MOD 30 MIN: CPT | Performed by: FAMILY MEDICINE

## 2021-08-27 ASSESSMENT — FIBROSIS 4 INDEX: FIB4 SCORE: 0.62

## 2021-08-27 ASSESSMENT — ENCOUNTER SYMPTOMS
DIZZINESS: 1
FEVER: 0
SORE THROAT: 0
VOMITING: 0
COUGH: 0

## 2021-08-27 NOTE — PROGRESS NOTES
Subjective:     Emeli Justin is a 40 y.o. female who presents for Dizziness (3x days, light headed, fatigue, nausea, weak)    HPI  Pt presents for evaluation of an acute problem  Patient with dizziness and nausea for the past 3 days  Feeling fatigued   When up walking around for more than about an hour, feels her legs are going to give out   Having some myalgias   Recently treated with prednisone taper for urticaria   Actually feeling quite a bit better today compared to yesterday  Does not have symptoms currently in office  Still eating and drinking normally    Review of Systems   Constitutional: Negative for fever.   HENT: Negative for sore throat.    Respiratory: Negative for cough.    Gastrointestinal: Negative for vomiting.   Skin: Negative for rash.   Neurological: Positive for dizziness.     PMH:  has a past medical history of Mitral valve prolapse (2015) and Pre-eclampsia (2014).  MEDS:   Current Outpatient Medications:   •  predniSONE (DELTASONE) 10 MG Tab, Take 4 tabs by mouth on days 1-2, then take 2 tabs by mouth on days 3-4, then take 1 tab by mouth on days 5-6, Disp: 14 Tablet, Rfl: 0  •  Ascorbic Acid (VITAMIN C PO), Take 1 Tab by mouth every morning., Disp: , Rfl:   •  Cyanocobalamin (VITAMIN B12 PO), Take 1 Tab by mouth every morning., Disp: , Rfl:   •  therapeutic multivitamin-minerals (THERAGRAN-M) Tab, Take 1 Tab by mouth every day., Disp: , Rfl:   •  Coenzyme Q10 (COQ10 PO), Take 2 Tabs by mouth every morning. (Patient not taking: Reported on 8/27/2021), Disp: , Rfl:   ALLERGIES:   Allergies   Allergen Reactions   • Fish Oil      Full body rash     SURGHX: No past surgical history on file.  SOCHX:  reports that she has never smoked. She has never used smokeless tobacco. She reports that she does not drink alcohol and does not use drugs.     Objective:   /88 (BP Location: Left arm, Patient Position: Sitting, BP Cuff Size: Adult)   Pulse 91   Temp 36.2 °C (97.1 °F) (Temporal)   Resp  "14   Ht 1.6 m (5' 3\")   Wt 63 kg (139 lb)   SpO2 97%   BMI 24.62 kg/m²     Physical Exam  Constitutional:       General: She is not in acute distress.     Appearance: She is well-developed. She is not diaphoretic.   HENT:      Head: Normocephalic and atraumatic.      Right Ear: Tympanic membrane, ear canal and external ear normal.      Left Ear: Tympanic membrane, ear canal and external ear normal.      Nose: Nose normal.      Mouth/Throat:      Mouth: Mucous membranes are moist.      Pharynx: Oropharynx is clear. No oropharyngeal exudate or posterior oropharyngeal erythema.   Neck:      Trachea: No tracheal deviation.   Cardiovascular:      Rate and Rhythm: Normal rate and regular rhythm.   Pulmonary:      Effort: Pulmonary effort is normal. No respiratory distress.      Breath sounds: Normal breath sounds. No wheezing or rales.   Musculoskeletal:      Cervical back: Normal range of motion and neck supple. No tenderness.   Lymphadenopathy:      Cervical: No cervical adenopathy.   Skin:     General: Skin is warm and dry.      Findings: No rash.   Neurological:      General: No focal deficit present.      Mental Status: She is alert.      Cranial Nerves: No cranial nerve deficit.      Coordination: Coordination normal.      Gait: Gait normal.      Comments: No reproduction of symptoms with Dianna-Hallpike maneuvers         Assessment/Plan:   Assessment    1. Dizziness  - CBC WITH DIFFERENTIAL; Future  - Comp Metabolic Panel; Future  - TSH WITH REFLEX TO FT4; Future    Patient with some feelings of lightheadedness the last few days.  She actually has improved quite a bit today compared to yesterday.  Unable to reproduce her lightheaded feeling in office.  Ear exam normal, neuro exam normal, no reproduction with Dianna-Hallpike maneuvers.  She recently was on a course of steroids and advised that the symptoms could be related to medication side effect.  The fact that she is improving is reassuring.  If symptoms starting " to recur, would recommend laboratory evaluation to ensure no anemia, thyroid dysfunction, or electrolyte abnormality.  Patient is agreeable and will plan to get laboratory testing if symptoms recur.

## 2021-09-28 ENCOUNTER — OFFICE VISIT (OUTPATIENT)
Dept: URGENT CARE | Facility: CLINIC | Age: 41
End: 2021-09-28
Payer: COMMERCIAL

## 2021-09-28 VITALS
WEIGHT: 140 LBS | BODY MASS INDEX: 24.8 KG/M2 | SYSTOLIC BLOOD PRESSURE: 114 MMHG | RESPIRATION RATE: 14 BRPM | TEMPERATURE: 98.6 F | DIASTOLIC BLOOD PRESSURE: 68 MMHG | HEART RATE: 87 BPM | OXYGEN SATURATION: 99 % | HEIGHT: 63 IN

## 2021-09-28 DIAGNOSIS — L02.224 BOIL, GROIN: ICD-10-CM

## 2021-09-28 PROCEDURE — 99213 OFFICE O/P EST LOW 20 MIN: CPT | Performed by: NURSE PRACTITIONER

## 2021-09-28 RX ORDER — SULFAMETHOXAZOLE AND TRIMETHOPRIM 800; 160 MG/1; MG/1
1 TABLET ORAL 2 TIMES DAILY
Qty: 14 TABLET | Refills: 0 | Status: SHIPPED | OUTPATIENT
Start: 2021-09-28 | End: 2021-10-05

## 2021-09-28 ASSESSMENT — FIBROSIS 4 INDEX: FIB4 SCORE: 0.63

## 2021-09-29 ASSESSMENT — ENCOUNTER SYMPTOMS
FEVER: 0
VOMITING: 0
NAUSEA: 0
CHILLS: 0
MYALGIAS: 0

## 2021-09-29 NOTE — PROGRESS NOTES
Subjective:     Emeli Justin is a 41 y.o. female who presents for Wound Infection (pubic area cyst infection , one on back of left leg x 1 week )      HPI  Pt presents for evaluation of a new problem. Emeli is a pleasant 41 year old female who presents to  today for a possible cyst that occurred 7 days ago on her right groin. She has been using neosporin, medical honey will wound which has since started to drain.  She does have mild tenderness to palpation.  This does seem to have improved since presentation.  She then developed a pimple on her posterior right thigh.  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  She denies any fevers, chills, nausea or vomiting.    Review of Systems   Constitutional: Negative for chills and fever.   Gastrointestinal: Negative for nausea and vomiting.   Genitourinary: Negative for dysuria.   Musculoskeletal: Negative for myalgias.       PMH:   Past Medical History:   Diagnosis Date   • Mitral valve prolapse 2015   • Pre-eclampsia 2014     ALLERGIES:   Allergies   Allergen Reactions   • Fish Oil      Full body rash     SURGHX: History reviewed. No pertinent surgical history.  SOCHX:   Social History     Socioeconomic History   • Marital status:      Spouse name: Not on file   • Number of children: Not on file   • Years of education: Not on file   • Highest education level: Not on file   Occupational History   • Not on file   Tobacco Use   • Smoking status: Never Smoker   • Smokeless tobacco: Never Used   Vaping Use   • Vaping Use: Never used   Substance and Sexual Activity   • Alcohol use: Never   • Drug use: Never   • Sexual activity: Yes     Comment: , 3 children   Other Topics Concern   • Not on file   Social History Narrative   • Not on file     Social Determinants of Health     Financial Resource Strain:    • Difficulty of Paying Living Expenses:    Food Insecurity:    • Worried About Running Out of Food in the Last Year:    • Ran Out  "of Food in the Last Year:    Transportation Needs:    • Lack of Transportation (Medical):    • Lack of Transportation (Non-Medical):    Physical Activity:    • Days of Exercise per Week:    • Minutes of Exercise per Session:    Stress:    • Feeling of Stress :    Social Connections:    • Frequency of Communication with Friends and Family:    • Frequency of Social Gatherings with Friends and Family:    • Attends Sikhism Services:    • Active Member of Clubs or Organizations:    • Attends Club or Organization Meetings:    • Marital Status:    Intimate Partner Violence:    • Fear of Current or Ex-Partner:    • Emotionally Abused:    • Physically Abused:    • Sexually Abused:      FH:   Family History   Problem Relation Age of Onset   • Diabetes Mother    • Hypertension Mother    • Hypertension Father    • Diabetes Father          Objective:   /68 (BP Location: Left arm, Patient Position: Sitting, BP Cuff Size: Adult)   Pulse 87   Temp 37 °C (98.6 °F) (Temporal)   Resp 14   Ht 1.6 m (5' 3\")   Wt 63.5 kg (140 lb)   SpO2 99%   BMI 24.80 kg/m²     Physical Exam  Vitals and nursing note reviewed.   Constitutional:       General: She is not in acute distress.     Appearance: Normal appearance. She is normal weight. She is not ill-appearing or toxic-appearing.   HENT:      Head: Normocephalic.      Right Ear: External ear normal.      Left Ear: External ear normal.      Nose: No congestion or rhinorrhea.      Mouth/Throat:      Pharynx: No oropharyngeal exudate or posterior oropharyngeal erythema.   Eyes:      General:         Right eye: No discharge.         Left eye: No discharge.      Pupils: Pupils are equal, round, and reactive to light.   Cardiovascular:      Rate and Rhythm: Normal rate and regular rhythm.   Pulmonary:      Effort: Pulmonary effort is normal.   Abdominal:      General: Abdomen is flat.   Musculoskeletal:         General: Normal range of motion.      Cervical back: Normal range of motion " and neck supple.   Skin:     General: Skin is dry.      Comments: Infected hair follicle of right groin.  Negative for abscess or fluctuance.  There is area of erythema surrounding follicle.  Mild tenderness to palpation.  No active drainage.   Neurological:      General: No focal deficit present.      Mental Status: She is alert and oriented to person, place, and time. Mental status is at baseline.   Psychiatric:         Mood and Affect: Mood normal.         Behavior: Behavior normal.         Thought Content: Thought content normal.         Judgment: Judgment normal.         Assessment/Plan:   Assessment    1. Boil, groin  mupirocin (BACTROBAN) 2 % Ointment    sulfamethoxazole-trimethoprim (BACTRIM DS) 800-160 MG tablet     Her boil does appear to be healing.  We did discuss possibility of having a MRSA infection.  She declined wound culture today.  She is encouraged to continue doing warm compresses and apply mupirocin ointment.  Contingent antibiotic sent to be started only if symptoms worsen.  She verbalizes understanding.  AVS handout given and reviewed with patient. Pt educated on red flags and when to seek treatment back in ER or UC.

## 2021-10-05 ENCOUNTER — TELEPHONE (OUTPATIENT)
Dept: URGENT CARE | Facility: CLINIC | Age: 41
End: 2021-10-05

## 2022-07-11 NOTE — OP THERAPY DAILY TREATMENT
Outpatient Physical Therapy  DAILY TREATMENT     [facility]  [address]  [place] [address]  Phone:  [phone]  Fax:  [phone]    Date: 07/07/2020    Patient: Emeli Justin  YOB: 1980  MRN: [id number]     Time Calculation    Start time: 0858  Stop time: 0945 Time Calculation (min): 47 minutes         Chief Complaint: No chief complaint on file.    Visit #: 6    SUBJECTIVE: I am just feeling sore     OBJECTIVE:  Current objective measures:          Therapeutic Exercises (CPT 82503):     1. Standing extension     2. Bridge with one leg flexed ,  1x 10 , cues for hip stability     3. Quadriped with leg extension , 10 sec x 10 times     4. Bridge with 10 sec holds , 2 x 10     5. Prone supermans on swiss ball , 1 min x 3 reps , relief with extension     6. Ball wall squats , cues for neutral spine     7. Alt GH extension with lmbar stable     8. Quadriped extension with arms and legs     9. Modified side planks , added to HEP     10. UPOC due 8/12/20    Therapeutic Treatments and Modalities:     1. E Stim Unattended (CPT 27236), Russian stim 5 /5 x 15 min l/s paraspinals     2. Manual Therapy (CPT 84478), PA L5 unilateral Gr 3, IASTM to L/s paraspinals,     Time-based treatments/modalities:    Physical Therapy Timed Treatment Charges  Manual therapy minutes (CPT 22292): 30 minutes  Therapeutic exercise minutes (CPT 05052): 5 minutes        ASSESSMENT:   Response to treatment: Patient's symptoms are centralizing and patient is demonstrating improved overall strength and stability.  Patient would benefit from dry needling next treatment.     PLAN/RECOMMENDATIONS:   Plan for treatment: therapy treatment to continue next visit.  See Progress note  Planned interventions for next visit: continue with current treatment.       
decreased po intake

## 2024-08-05 NOTE — H&P
Brief Pre-Op Note/Sedation Assessment      Ivania Jenkins  1977  [id number]  8:23 AM    Planned Procedure: Cardiac Catheterization Procedure  Post Procedure Plan: Return to same level of care  Consent: I have discussed with the patient and/or the patient representative the indication, alternatives, and the possible risks and/or complications of the planned procedure and the anesthesia methods. The patient and/or patient representative appear to understand and agree to proceed.        Chief Complaint:   NSTEMI      Indications for Cath Procedure:  Presentation:  ACS > 24 hrs  2.  Anginal Classification within 2 weeks:  CCS III - Symptoms with everyday living activities, i.e., moderate limitation  3.  Angina Symptoms Assessment:  Typical Chest Pain  4.  Heart Failure Class within last 2 weeks:  No symptoms  5.  Cardiovascular Instability:  No    Prior Ischemic Workup/Eval:  Pre-Procedural Medications: Yes: Aspirin and STATIN  2.   Stress Test Completed?  No    Does Patient need surgery?  Cath Valve Surgery:  No    Pre-Procedure Medical History:  Vital Signs:  /66   Pulse 60   Temp 97.3 °F (36.3 °C) (Axillary)   Resp 18   Ht 1.753 m (5' 9\")   Wt 121.3 kg (267 lb 6.7 oz)   SpO2 100%   BMI 39.49 kg/m²     Allergies:    Allergies   Allergen Reactions    Adhesive Tape      Medications:    Current Facility-Administered Medications   Medication Dose Route Frequency Provider Last Rate Last Admin    sodium chloride flush 0.9 % injection 5-40 mL  5-40 mL IntraVENous 2 times per day Destiney Guzman APRN - CNP        sodium chloride flush 0.9 % injection 5-40 mL  5-40 mL IntraVENous PRN Destiney Guzman APRN - CNP        0.9 % sodium chloride infusion   IntraVENous PRN Destiney Guzman APRN - CNP        heparin 25,000 units in dextrose 5% 250 mL (premix) infusion  0-4,000 Units/hr IntraVENous Continuous Alok Baeza MD 19.6 mL/hr at 08/04/24 2148 1,960 Units/hr at 08/04/24 2148    NIFEdipine  Hospital Medicine History & Physical Note    Date of Service  1/9/2020    Primary Care Physician  Pcp Pt States None    Consultants  None    Code Status  Full code    Chief Complaint  Syncope    History of Presenting Illness  39 y.o. female who presented 1/8/2020 with a syncopal episode that occurred earlier today.  Patient states she had a viral upper respiratory tract infection a week ago.  Today at work she developed symptoms of lightheadedness after which she had a syncopal episode.  She noted some left-sided chest pain without radiation.  Her pain would improve with leaning forward.  She denies any fevers, chills, headache, nausea, vomiting or abdominal pain.  She denies any new medications.  She does not smoke, drink alcohol or use illicit drugs.    EKG interpreted by me reveals sinus rhythm with minimal diffuse ST elevations consistent with pericarditis  Chest x-ray interpreted by me reveals no acute cardiopulmonary process    Review of Systems  Review of Systems   Constitutional: Negative for chills, diaphoresis and fever.   HENT: Negative for hearing loss and sore throat.    Eyes: Negative for blurred vision.   Respiratory: Negative for cough, sputum production, shortness of breath and wheezing.    Cardiovascular: Positive for chest pain. Negative for palpitations and leg swelling.   Gastrointestinal: Negative for abdominal pain, blood in stool, diarrhea, nausea and vomiting.   Genitourinary: Negative for dysuria, flank pain and urgency.   Musculoskeletal: Negative for back pain, joint pain, myalgias and neck pain.   Skin: Negative for rash.   Neurological: Positive for dizziness and loss of consciousness. Negative for focal weakness, seizures and headaches.   Endo/Heme/Allergies: Does not bruise/bleed easily.   Psychiatric/Behavioral: Negative for suicidal ideas.   All other systems reviewed and are negative.      Past Medical History   has a past medical history of Mitral valve prolapse (2015) and  Pre-eclampsia (2014).    Surgical History  No pertinent surgical history    Family History  No pertinent family history    Social History   reports that she has never smoked. She has never used smokeless tobacco. She reports that she does not drink alcohol or use drugs.    Allergies  No Known Allergies    Medications  Prior to Admission Medications   Prescriptions Last Dose Informant Patient Reported? Taking?   therapeutic multivitamin-minerals (THERAGRAN-M) Tab   Yes Yes   Sig: Take 1 Tab by mouth every day.      Facility-Administered Medications: None       Physical Exam  Temp:  [35.9 °C (96.7 °F)] 35.9 °C (96.7 °F)  Pulse:  [66-85] 85  Resp:  [14-18] 18  BP: ()/(34-66) 111/55  SpO2:  [100 %] 100 %    Physical Exam  Vitals signs and nursing note reviewed.   Constitutional:       General: She is not in acute distress.     Appearance: Normal appearance.   HENT:      Head: Normocephalic and atraumatic.      Nose: Nose normal.      Mouth/Throat:      Mouth: Mucous membranes are moist.   Eyes:      Extraocular Movements: Extraocular movements intact.      Conjunctiva/sclera: Conjunctivae normal.      Pupils: Pupils are equal, round, and reactive to light.   Neck:      Musculoskeletal: Normal range of motion and neck supple.   Cardiovascular:      Rate and Rhythm: Normal rate and regular rhythm.      Pulses: Normal pulses.      Heart sounds: Normal heart sounds.   Pulmonary:      Effort: Pulmonary effort is normal. No respiratory distress.      Breath sounds: Normal breath sounds. No wheezing, rhonchi or rales.   Abdominal:      General: Bowel sounds are normal. There is no distension.      Palpations: Abdomen is soft.      Tenderness: There is no tenderness.   Musculoskeletal: Normal range of motion.         General: No swelling or tenderness.   Lymphadenopathy:      Cervical: No cervical adenopathy.   Skin:     General: Skin is warm.      Coloration: Skin is not jaundiced.      Findings: No rash.   Neurological:       General: No focal deficit present.      Mental Status: She is alert and oriented to person, place, and time.      Cranial Nerves: No cranial nerve deficit.      Motor: No weakness.   Psychiatric:         Mood and Affect: Mood normal.         Behavior: Behavior normal.         Laboratory:  Recent Labs     01/08/20 2000   WBC 7.2   RBC 4.31   HEMOGLOBIN 13.2   HEMATOCRIT 41.4   MCV 96.1   MCH 30.6   MCHC 31.9*   RDW 42.8   PLATELETCT 267   MPV 9.5     Recent Labs     01/08/20 2000   SODIUM 139   POTASSIUM 3.6   CHLORIDE 105   CO2 25   GLUCOSE 159*   BUN 18   CREATININE 0.75   CALCIUM 8.6     Recent Labs     01/08/20 2000   ALTSGPT 12   ASTSGOT 15   ALKPHOSPHAT 41   TBILIRUBIN 0.4   GLUCOSE 159*         No results for input(s): NTPROBNP in the last 72 hours.      Recent Labs     01/08/20 2000   TROPONINT <6       Urinalysis:    No results found     Imaging:  DX-CHEST-2 VIEWS   Final Result         1.  No acute cardiopulmonary disease.      EC-ECHOCARDIOGRAM COMPLETE W/O CONT    (Results Pending)         Assessment/Plan:  I anticipate this patient is appropriate for observation status at this time.    Pericarditis- (present on admission)  Assessment & Plan  I have started the patient on aspirin 650 mg 3 times daily and colchicine 0.6 mg daily  Check 2D echo  Check ESR and CRP  Continuous cardiac monitoring with serial EKG and troponin    Syncope- (present on admission)  Assessment & Plan  Rule out cardiogenic causes  Continuous cardiac monitoring on telemetry  Check 2D echo  Check orthostatics        VTE prophylaxis: SCD